# Patient Record
Sex: MALE | Race: WHITE | Employment: OTHER | URBAN - METROPOLITAN AREA
[De-identification: names, ages, dates, MRNs, and addresses within clinical notes are randomized per-mention and may not be internally consistent; named-entity substitution may affect disease eponyms.]

---

## 2018-01-02 ENCOUNTER — HOSPITAL ENCOUNTER (OUTPATIENT)
Dept: PHYSICAL THERAPY | Age: 63
Discharge: HOME | End: 2018-01-02
Payer: COMMERCIAL

## 2018-01-02 PROCEDURE — 97110 THERAPEUTIC EXERCISES: CPT

## 2018-01-02 PROCEDURE — 97162 PT EVAL MOD COMPLEX 30 MIN: CPT

## 2018-01-02 NOTE — PROGRESS NOTES
[facility] at [address] [place] [address]  Phone: [phone]   Fax: [phone]    Plan of Care/ Statement of Necessity for Physical Therapy Services     Patient name: Bradley Godoy Start of Care: 2018   Referral source: Sanjay Cole [place] : 1955    Medical Diagnosis: Low back pain [M54.5]   Onset Date: 2017   Treatment Diagnosis: Mechanical low back pain   Prior Hospitalization: see medical history Provider#: [id number]   Medications: Verified on Patient summary List    Comorbidities: right knee replacement , right eye muscle repair/surgery, arthritis   Prior Level of Function: was independent with ADLs and activities now has to modify secondary to pain     The Plan of Care and following information is based on the information from the initial evaluation. Assessment/ key information:   Patient was a 57year old male who presents with mechanical low back pain. On  when he fell off a ladder about 10 feet. He went to ER two days later and did x-ray which indicated no acute fracture. He was still having pain mostly with walking but has progressed a great deal. Patient has addressed with naproxin and has returned to physical activity around the house. He denies any radiating pain. Examination performed revealing full trunk AROM however pain at end range with flexion>extension, TTP along lumbar and gluteal region/sacral region, and decreased strength in hip flexion. Patient discussed with patient how he pain has greatly improved and feel he could perform HEP on his own. PT discussed options with patient and decided on having patient stay on caseload with possibility of continuing with therapy services should pain exacerbate or no change in the next two weeks. If so, patient would return and resume therapy services to address deficits with low back pain.    Evaluation Complexity History MEDIUM  Complexity : 1-2 comorbidities / personal factors will impact the outcome/ POC ; Examination MEDIUM Complexity : 3 Standardized tests and measures addressing body structure, function, activity limitation and / or participation in recreation  ;Presentation MEDIUM Complexity : Evolving with changing characteristics  ; Clinical Decision Making MEDIUM Complexity : FOTO score of 26-74  Overall Complexity Rating: MEDIUM  Problem List: pain affecting function, decrease ROM, decrease strength, edema affecting function, impaired gait/ balance, decrease ADL/ functional abilitiies, decrease activity tolerance, decrease flexibility/ joint mobility and decrease transfer abilities   Treatment Plan may include any combination of the following: Therapeutic exercise, Therapeutic activities, Neuromuscular re-education, Physical agent/modality, Gait/balance training, Manual therapy, Patient education, Functional mobility training and Stair training  Patient / Family readiness to learn indicated by: asking questions, trying to perform skills and interest  Persons(s) to be included in education: patient (P)  Barriers to Learning/Limitations: None  Patient Goal (s): decrease/eliminate pain clearing to return to work-  Patient Self Reported Health Status: good  Rehabilitation Potential: good    Short Term Goals: To be accomplished in 2 weeks:  1. Patient will be compliant with HEP as PT progresses. Status @ Eval: initiated HEP  2. Patient will have decreased pain to 1/10 at worst with activities to be able to tolerate prolonged ambulation  Status @ Eval: 2/10 today  Long Term Goals: To be accomplished in 4 weeks:  1. Patient will have be compliant and independent with HEP to continue with maintenance program at time of discharge. Status @ Eval: initiated   2. Patient will have decreased pain to 0/10 at most with activities to return to work duties. Status @ Eval: 2/10  3.  Patient will have increased hip flexion strength to 5/5 at least to be able to tolerate prolonged ambulation when performing household chores. Status @ Eval: 4/5 left, 4+/5 right  4. Patient will have improved FOTO score by 14 points indicating improved functional activity. Status @ Eval: 60  Frequency / Duration: Patient to be seen 2 times per week for 4 weeks. Patient/ Caregiver education and instruction: Diagnosis, prognosis, other POC and exercises   [x]  Plan of care has been reviewed with JESE Echeverria 1/2/2018 10:40 AM  _____________________________________________________________________  I certify that the above Therapy Services are being furnished while the patient is under my care. I agree with the treatment plan and certify that this therapy is necessary.     Physician's Signature:____________________  Date:__________Time:______    Please sign and return to [facility] at [address] [place] [address]  Phone: [phone]   Fax: [phone]

## 2018-01-02 NOTE — PROGRESS NOTES
PT DAILY TREATMENT NOTE     Patient Name: Carli Zhang  Date:2018  : 1955  [x]  Patient  Verified  Payor: DA VENTURA / Plan: [address] [place] [address] / Product Type: PPO /    In time:10:40  Out time:11:25  Total Treatment Time (min): 45  Visit #: 1 of 8    Treatment Area: Low back pain [M54.5]    SUBJECTIVE  Pain Level (0-10 scale): 2/10  Any medication changes, allergies to medications, adverse drug reactions, diagnosis change, or new procedure performed?: [x] No    [] Yes (see summary sheet for update)  Subjective functional status/changes:   [] No changes reported  SEE POC    OBJECTIVE  Physical Therapy Evaluation - Lumbar Spine ([facility])    OBJECTIVE  Posture:  Lateral Shift: [] R    [] L     [] +  [] -  Kyphosis: [] Increased [] Decreased   []  WNL  Lordosis:  [] Increased [] Decreased   [] WNL  Pelvic symmetry: [] WNL    [] Other:    Gait:  [x] Normal     [] Abnormal:    Active Movements: [] N/A   [] Too acute   [] Other:  ROM % AROM % PROM Comments:pain, area   Forward flexion 40-60 100  Pain with flexion minimal centralized   Extension 20-30 100  Pain with extension minimal centralized   SB right 20-30 100     SB left 20-30 100     Rotation right 5-10 100     Rotation left 5-10 100       Repeated Movements   Effects on present pain: produces (RI), abolishes (A), increases (incr), decreases (decr), centralizes (C), peripheral (PH), no effect (NE)   Pre-Test Sx Flexion Repeated Flexion Extension Repeated Extension Repeated SBL Repeated SBR   Sitting          Standing     NE     Lying      N/A N/A   Comments:  Side Glide:  Sustained passive positioning test:    Neuro Screen [] WNL  Myotome/Dermatome/Reflexes:  Comments:    Dural Mobility:  SLR Sitting: [] R    [] L    [] +    [] -  @ (degrees):           Supine: [x] R    [x] L    [] +    [x] -  @ (degrees):   Slump Test: [] R    [] L    [] +    [] -  @ (degrees):   Prone Knee Bend: [] R    [] L    [] +    [] -     Palpation   [x] Min  [] Mod  [] Severe    Location:left iliocostalis lumborum and pirifromis left side  [] Min  [] Mod  [] Severe    Location:  [] Min  [] Mod  [] Severe    Location:    Stabilization Tests  Multifidus Test  Level 1: Prone abdominal draw in (Goal 6-10mmHG):  Level 2: Supported leg load supine (needle deflection at 40mmHG): [] Yes  [] No   Level 3: Unsupported leg load supine (needle deflection at 40mmHG): [] Yes  [] No     Strength   L(0-5) R (0-5) N/T   Hip Flexion (L1,2) 4 4+ []   Knee Extension (L3,4) 5 5 []   Ankle Dorsiflexion (L4)   []   Great Toe Extension (L5)   []   Ankle Plantarflexion (S1)   []   Knee Flexion (S1,2) 5 5 []   Upper Abdominals   []   Lower Abdominals   []   Paraspinals   []   Back Rotators   []   Gluteus Juve   []   Other   []     Special Tests  Lumbar:  Lumb.  Compression: [] Pos  [] Neg               Lumbar Distraction:   [] Pos  [] Neg    Quadrant:  [] Pos  [] Neg   [] Flex  [] Ext    Sacroilliac:  Gaenslen's: [] R    [] L    [] +    [] -     Compression: [x] +    [] -     Gapping:  [] +    [] -     Thigh Thrust: [] R    [] L    [] +    [] -     Leg Length: [] +    [] -   Position:    Crests:    ASIS:    PSIS:    Sacral Sulcus:    Mobility: Standing flex:     Sitting flex:     Supine to sit:     Prone knee bend:         Hip: Mitra Maid:  [] R    [] L    [] +    [] -     Scour:  [] R    [] L    [] +    [] -     Piriformis: [] R    [] L    [] +    [] -          Deficits: Peng's: [] R    [] L    [] +    [] -     Trvais: [] R    [] L    [] +    [] -     Hamstrings 90/90:    Gastrocsoleus (to neutral): Right: Left:       Global Muscular Weakness:  Abdominals:  Quadratus Lumborum:  Paraspinals:3/5   Other:    Other tests/comments:    30 min [x]Eval                  []Re-Eval       15 min Therapeutic Exercise:  [] See flow sheet :   Rationale: increase ROM, increase strength and improve coordination to improve the patients ability to maneuver lumbar region such as squatting or walking    Other Objective/Functional Measures: FOTO:     Pain Level (0-10 scale) post treatment: 2/10    ASSESSMENT/Changes in Function:  [x]  See Plan of Care  []  See progress note/recertification  []  See Discharge Summary         Progress towards goals / Updated goals:  SEE POC    PLAN  []  Upgrade activities as tolerated     []  Continue plan of care  []  Update interventions per flow sheet       []  Discharge due to:_  []  Other:_  Patient to be put on hold for now if no improvement with patient performing HEP independently within two weeks    Jessie Lopez 1/2/2018  10:40 AM    No future appointments.

## 2018-01-26 ENCOUNTER — HOSPITAL ENCOUNTER (OUTPATIENT)
Dept: PHYSICAL THERAPY | Age: 63
Discharge: HOME | End: 2018-01-26
Payer: COMMERCIAL

## 2018-01-26 PROCEDURE — 97110 THERAPEUTIC EXERCISES: CPT

## 2018-01-26 NOTE — PROGRESS NOTES
[facility] at [address] [place] [address]  Phone: [phone]   Fax: [phone]    Progress Report      Patient name: Carli Zhang     Start of Care: 2018  Referral source: Myrna Ibarra, [id number]    : 1955  Medical/Treatment Diagnosis: Low back pain [M54.5]  Onset Date: 2017  Prior Hospitalization: see medical history   Provider#: [id number]  Comorbidities: right knee replacement , right eye muscle repair/surgery, arthritis  Prior Level of Function: was independent with ADLs and activities now has to modify secondary to pain      Medications: Verified on Patient Summary List    Visits from Start of Care: 2    Missed Visits: 0  Reporting Period : 2018 to 2018    Subjective Reports: Patient reports no change with pain and would like to resume therapy services    Short Term Goals: To be accomplished in 2 weeks:  1. Patient will be compliant with HEP as PT progresses. Status @ Eval: initiated HEP  Status @ PN: patient compliant   2. Patient will have decreased pain to 1/10 at worst with activities to be able to tolerate prolonged ambulation  Status @ Eval: 2/10 today  Status @ PN: 4/10 at night   Long Term Goals: To be accomplished in 4 weeks:  1. Patient will have be compliant and independent with HEP to continue with maintenance program at time of discharge. Status @ Eval: initiated   Status @ PN: compliant  2. Patient will have decreased pain to 0/10 at most with activities to return to work duties. Status @ Eval: 2/10  Status @ PN: 4/10 at night  3. Patient will have increased hip flexion strength to 5/5 at least to be able to tolerate prolonged ambulation when performing household chores. Status @ Eval: 4/5 left, 4+/5 right  Status @ PN: 4+/5 right, 3+/5 left   4. Patient will have improved FOTO score by 14 points indicating improved functional activity.   Status @ Eval: 61  Status @ PN: to be retested 5th visit    Key functional changes: no change    Problems/ barriers to goal attainment: none     Assessment / Recommendations:  Patient tolerated new exercises fair with no reports of increased pain.  Patient will continue to benefit from skilled PT services to modify and progress therapeutic interventions, address functional mobility deficits, address ROM deficits, address strength deficits, analyze and address soft tissue restrictions and analyze and cue movement patterns to attain remaining goals.      Problem List: pain affecting function, decrease ROM, decrease strength, edema affecting function, impaired gait/ balance, decrease ADL/ functional abilitiies, decrease activity tolerance, decrease flexibility/ joint mobility and decrease transfer abilities   Treatment Plan: Therapeutic exercise, Therapeutic activities, Neuromuscular re-education, Physical agent/modality, Gait/balance training, Manual therapy, Patient education, Functional mobility training, Home safety training and Stair training    Patient Goal (s) has been updated and includes: decrease/eliminate pain clearing to return to work-      Updated Goals to be accomplished in 4 weeks:       Continue with unmet goals     Frequency / Duration: Patient to be seen 2 times per week for 4 weeks:      Javi Veras 1/26/2018 4:00 PM

## 2018-01-26 NOTE — PROGRESS NOTES
PT DAILY TREATMENT NOTE     Patient Name: Deepika Wallace  Date:2018  : 1955  [x]  Patient  Verified  Payor: BLUE CROSS / Plan: [address] [place] [address] / Product Type: PPO /    In time:03:30  Out time: 04:15  Total Treatment Time (min): 40  Visit #: 2 of 8    Treatment Area: Low back pain [M54.5]    SUBJECTIVE  Pain Level (0-10 scale): 1-2/10  Any medication changes, allergies to medications, adverse drug reactions, diagnosis change, or new procedure performed?: [x] No    [] Yes (see summary sheet for update)  Subjective functional status/changes:   [] No changes reported  Patient reports tried doing HEP on his own however pain has not improved so wants to continue with therapy services. OBJECTIVE    40 min Therapeutic Exercise:  [] See flow sheet :   Rationale: increase ROM, increase strength, improve coordination and improve balance to improve the patients ability to tolerate prolonged trunk flexion     Other Objective/Functional Measures:   Refer to goals      Pain Level (0-10 scale) post treatment:  0/10    ASSESSMENT/Changes in Function:   Patient tolerated new exercises fair with no reports of increased pain. Patient will continue to benefit from skilled PT services to modify and progress therapeutic interventions, address functional mobility deficits, address ROM deficits, address strength deficits, analyze and address soft tissue restrictions and analyze and cue movement patterns to attain remaining goals. []  See Plan of Care  []  See progress note/recertification  []  See Discharge Summary         Progress towards goals / Updated goals:  Short Term Goals: To be accomplished in 2 weeks:  1. Patient will be compliant with HEP as PT progresses. Status @ Eval: initiated HEP  Status @ PN: patient compliant   2.  Patient will have decreased pain to 1/10 at worst with activities to be able to tolerate prolonged ambulation  Status @ Eval: 2/10 today  Status @ PN: 4/10 at night   Long Term Goals: To be accomplished in 4 weeks:  1. Patient will have be compliant and independent with HEP to continue with maintenance program at time of discharge. Status @ Eval: initiated   Status @ PN: compliant  2. Patient will have decreased pain to 0/10 at most with activities to return to work duties. Status @ Eval: 2/10  Status @ PN: 4/10 at night  3. Patient will have increased hip flexion strength to 5/5 at least to be able to tolerate prolonged ambulation when performing household chores. Status @ Eval: 4/5 left, 4+/5 right  Status @ PN: 4+/5 right, 3+/5 left   4. Patient will have improved FOTO score by 14 points indicating improved functional activity. Status @ Eval: 60  Status @ PN: to be retested 5th visit    Frequency / Duration: Patient to be seen 2 times per week for 4 weeks. PLAN  []  Upgrade activities as tolerated     [x]  Continue plan of care  []  Update interventions per flow sheet       []  Discharge due to:_  []  Other:_      Ranjeet Araujo 1/26/2018  3:36 PM    No future appointments.

## 2018-01-29 ENCOUNTER — HOSPITAL ENCOUNTER (OUTPATIENT)
Dept: PHYSICAL THERAPY | Age: 63
Discharge: HOME | End: 2018-01-29
Payer: COMMERCIAL

## 2018-01-29 PROCEDURE — 97110 THERAPEUTIC EXERCISES: CPT

## 2018-01-29 NOTE — PROGRESS NOTES
PT DAILY TREATMENT NOTE     Patient Name: Kaiden Castro  Date:2018  : 1955  [x]  Patient  Verified  Payor: BLUE CROSS / Plan: [address] [place] [address] / Product Type: PPO /    In time:08:30  Out time:09:15  Total Treatment Time (min): 45  Visit #: 3 of 9    Treatment Area: There are no admission diagnoses documented for this encounter. SUBJECTIVE  Pain Level (0-10 scale): 0/10 but mostly pain at night  Any medication changes, allergies to medications, adverse drug reactions, diagnosis change, or new procedure performed?: [x] No    [] Yes (see summary sheet for update)  Subjective functional status/changes:   [x] No changes reported      OBJECTIVE    45 min Therapeutic Exercise:  [] See flow sheet :   Rationale: increase ROM, increase strength, improve coordination and improve balance to improve the patients ability to maneuver back         With   [] TE   [] TA   [] neuro   [] other: Patient Education: [x] Review HEP    [] Progressed/Changed HEP based on:   [] positioning   [] body mechanics   [] transfers   [] heat/ice application    [] other:      Other Objective/Functional Measures:      Pain Level (0-10 scale) post treatment: 0/10    ASSESSMENT/Changes in Function:   Patient tolerated Oov activities well requiring vc's on proper form and to increase points of contact to increase balance as needed. No reports of increased pain. Patient will continue to benefit from skilled PT services to modify and progress therapeutic interventions, address functional mobility deficits, address ROM deficits, address strength deficits, analyze and address soft tissue restrictions and analyze and cue movement patterns to attain remaining goals. []  See Plan of Care  []  See progress note/recertification  []  See Discharge Summary         Progress towards goals / Updated goals:  Short Term Goals: To be accomplished in 2 weeks:  1. Patient will be compliant with HEP as PT progresses.   Status @ Eval: initiated HEP  Status @ PN: patient compliant   Current: continued compliance  2. Patient will have decreased pain to 1/10 at worst with activities to be able to tolerate prolonged ambulation  Status @ Eval: 2/10 today  Status @ PN: 4/10 at night   Long Term Goals: To be accomplished in 4 weeks:  1. Patient will have be compliant and independent with HEP to continue with maintenance program at time of discharge. Status @ Eval: initiated   Status @ PN: compliant  Current: continued compliance  2. Patient will have decreased pain to 0/10 at most with activities to return to work duties. Status @ Eval: 2/10  Status @ PN: 4/10 at night  3. Patient will have increased hip flexion strength to 5/5 at least to be able to tolerate prolonged ambulation when performing household chores. Status @ Eval: 4/5 left, 4+/5 right  Status @ PN: 4+/5 right, 3+/5 left   4. Patient will have improved FOTO score by 14 points indicating improved functional activity.   Status @ Eval: 60  Status @ PN: to be retested 5th visit       PLAN  []  Upgrade activities as tolerated     [x]  Continue plan of care  []  Update interventions per flow sheet       []  Discharge due to:_  []  Other:_      Atul Pump 1/29/2018  8:36 AM    Future Appointments  Date Time Provider Zenon Dixon   1/29/2018 9:00 AM [place] [facility]   1/30/2018 9:30 AM Walt Terry PTA [facility]

## 2018-01-30 ENCOUNTER — HOSPITAL ENCOUNTER (OUTPATIENT)
Dept: PHYSICAL THERAPY | Age: 63
Discharge: HOME | End: 2018-01-30
Payer: COMMERCIAL

## 2018-01-30 PROCEDURE — 97112 NEUROMUSCULAR REEDUCATION: CPT

## 2018-01-30 PROCEDURE — 97140 MANUAL THERAPY 1/> REGIONS: CPT

## 2018-01-30 NOTE — PROGRESS NOTES
PT DAILY TREATMENT NOTE     Patient Name: Adina Esparza  Date:2018  : 1955  [x]  Patient  Verified  Payor: BLUE CROSS / Plan: [address] [place] [address] / Product Type: PPO /    In time:915  Out time:1000  Total Treatment Time (min): 45  Visit #: 4 of 9    Treatment Area: Low back pain [M54.5]    SUBJECTIVE  Pain Level (0-10 scale):1/10  Any medication changes, allergies to medications, adverse drug reactions, diagnosis change, or new procedure performed?: [x] No    [] Yes (see summary sheet for update)  Subjective functional status/changes:   [] No changes reported  Pt reports that he has pain at night that is terrible but during the day he is fine. He reports that after he urinates in the morning the pain goes away. OBJECTIVE       20 min Neuromuscular Re-education:  [x]  See flow sheet :facilitate improved trunk mobility   Rationale: increase ROM, increase strength, improve coordination and increase proprioception  to improve the patients ability to sleep at night    25 min Manual Therapy:  Myofascial leg pull on left, MFR to left QL, multifidus and obliques   Rationale: decrease pain, increase ROM, increase tissue extensibility, decrease edema  and decrease trigger points to sleep at night            With   [] TE   [] TA   [x] neuro   [] other: Patient Education: [x] Review HEP    [] Progressed/Changed HEP based on:   [] positioning   [] body mechanics   [] transfers   [x] heat/ice application    [] other:      Other Objective/Functional Measures:     Pain Level (0-10 scale) post treatment: 0/10    ASSESSMENT/Changes in Function: Pt demonstrates left posterior rotated innominate corrected with myofascial work. Pt very limited with thoracic rotation contributing to ongoing pain in QL on left side.       Patient will continue to benefit from skilled PT services to address functional mobility deficits, address ROM deficits, address strength deficits, analyze and address soft tissue restrictions, analyze and cue movement patterns, analyze and modify body mechanics/ergonomics and assess and modify postural abnormalities to attain remaining goals. []  See Plan of Care  []  See progress note/recertification  []  See Discharge Summary         Progress towards goals / Updated goals:  Short Term Goals: To be accomplished in 2 weeks:  1. Patient will be compliant with HEP as PT progresses. Status @ Eval: initiated HEP  Status @ PN: patient compliant   Current: continued compliance  2. Patient will have decreased pain to 1/10 at worst with activities to be able to tolerate prolonged ambulation  Status @ Eval: 2/10 today  Status @ PN: 4/10 at night   Current: Continued pain at night  Long Term Goals: To be accomplished in 4 weeks:  1. Patient will have be compliant and independent with HEP to continue with maintenance program at time of discharge. Status @ Eval: initiated   Status @ PN: compliant  Current: continued compliance  2. Patient will have decreased pain to 0/10 at most with activities to return to work duties. Status @ Eval: 2/10  Status @ PN: 4/10 at night  3. Patient will have increased hip flexion strength to 5/5 at least to be able to tolerate prolonged ambulation when performing household chores. Status @ Eval: 4/5 left, 4+/5 right  Status @ PN: 4+/5 right, 3+/5 left   4. Patient will have improved FOTO score by 14 points indicating improved functional activity.   Status @ Eval: 60  Status @ PN: to be retested 5th visit  Current: NEARLY MET 72    PLAN  [x]  Upgrade activities as tolerated     [x]  Continue plan of care  []  Update interventions per flow sheet       []  Discharge due to:_  []  Other:_      Gasper Brower PTA 1/30/2018  6:55 AM    Future Appointments  Date Time Provider Zenon Dixon   1/30/2018 9:30 AM Gasper Brower PTA [facility]

## 2018-02-05 ENCOUNTER — HOSPITAL ENCOUNTER (OUTPATIENT)
Dept: PHYSICAL THERAPY | Age: 63
Discharge: HOME | End: 2018-02-05
Payer: COMMERCIAL

## 2018-02-05 PROCEDURE — 97110 THERAPEUTIC EXERCISES: CPT

## 2018-02-05 PROCEDURE — 97016 VASOPNEUMATIC DEVICE THERAPY: CPT

## 2018-02-05 NOTE — PROGRESS NOTES
PT DAILY TREATMENT NOTE     Patient Name: Lorraine Bowie  Date:2018  : 1955  [x]  Patient  Verified  Payor: DA [place] / Plan: [address] [place] [address] / Product Type: PPO /    In time:08:30  Out time:09: 30  Total Treatment Time (min): 60  Visit #:5 of 9    Treatment Area: Low back pain [M54.5]    SUBJECTIVE  Pain Level (0-10 scale): 0/10  Any medication changes, allergies to medications, adverse drug reactions, diagnosis change, or new procedure performed?: [x] No    [] Yes (see summary sheet for update)  Subjective functional status/changes:   [x] No changes reported      OBJECTIVE    Modality rationale: decrease pain and increase tissue extensibility to improve the patients ability to maneuver back    Min Type Additional Details    [] Estim:  []Unatt       []IFC  []Premod                        []Other:  []w/ice   []w/heat  Position:  Location:    [] Estim: []Att    []TENS instruct  []NMES                    []Other:  []w/US   []w/ice   []w/heat  Position:  Location:    []  Traction: [] Cervical       []Lumbar                       [] Prone          []Supine                       []Intermittent   []Continuous Lbs:  [] before manual  [] after manual    []  Ultrasound: []Continuous   [] Pulsed                           []1MHz   []3MHz W/cm2:  Location:    []  Iontophoresis with dexamethasone         Location: [] Take home patch   [] In clinic   10 []  Ice     [x]  heat  []  Ice massage  []  Laser   []  Anodyne Position prone  Location: lumbar/hip left side    []  Laser with stim  []  Other:  Position:  Location:    []  Vasopneumatic Device Pressure:       [] lo [] med [] hi   Temperature: [] lo [] med [] hi   [] Skin assessment post-treatment:  []intact []redness- no adverse reaction    []redness  adverse reaction:     50 min Therapeutic Exercise:  [] See flow sheet :   Rationale: increase ROM, increase strength, improve coordination and improve balance to improve the patients ability to increase lumbar/core stabilization    Other Objective/Functional Measures:      Pain Level (0-10 scale) post treatment: 0/10    ASSESSMENT/Changes in Function: Patient tolerated new exercises fair with no reports of increased pain. Patient will continue to benefit from skilled PT services to modify and progress therapeutic interventions, address functional mobility deficits, address ROM deficits, address strength deficits and analyze and address soft tissue restrictions to attain remaining goals. []  See Plan of Care  []  See progress note/recertification  []  See Discharge Summary         Progress towards goals / Updated goals:  Short Term Goals: To be accomplished in 2 weeks:  1. Patient will be compliant with HEP as PT progresses. Status @ Eval: initiated HEP  Status @ PN: patient compliant   Current: continued compliance  2. Patient will have decreased pain to 1/10 at worst with activities to be able to tolerate prolonged ambulation  Status @ Eval: 2/10 today  Status @ PN: 4/10 at night   Current: Continued pain at night 4-5/10  Long Term Goals: To be accomplished in 4 weeks:  1. Patient will have be compliant and independent with HEP to continue with maintenance program at time of discharge. Status @ Eval: initiated   Status @ PN: compliant  Current: continued compliance  2. Patient will have decreased pain to 0/10 at most with activities to return to work duties. Status @ Eval: 2/10  Status @ PN: 4/10 at night  Current: pain at night 4-5/10  3. Patient will have increased hip flexion strength to 5/5 at least to be able to tolerate prolonged ambulation when performing household chores. Status @ Eval: 4/5 left, 4+/5 right  Status @ PN: 4+/5 right, 3+/5 left   4. Patient will have improved FOTO score by 14 points indicating improved functional activity.   Status @ Eval: 61  Status @ PN: to be retested 5th visit  Current: NEARLY MET 72    PLAN  []  Upgrade activities as tolerated     [x]  Continue plan of care  []  Update interventions per flow sheet       []  Discharge due to:_  []  Other:_  Add more trunk extension exercises next visit    Watt Kehr 2/5/2018  8:35 AM    Future Appointments  Date Time Provider Zenon Dixon   2/7/2018 9:00 AM [place] [facility]

## 2018-02-07 ENCOUNTER — HOSPITAL ENCOUNTER (OUTPATIENT)
Dept: PHYSICAL THERAPY | Age: 63
Discharge: HOME | End: 2018-02-07
Payer: COMMERCIAL

## 2018-02-07 PROCEDURE — 97110 THERAPEUTIC EXERCISES: CPT

## 2018-02-07 NOTE — PROGRESS NOTES
PT DAILY TREATMENT NOTE     Patient Name: Lexi Kaur  Date:2018  : 1955  [x]  Patient  Verified  Payor: BLUE CROSS / Plan: [address] [place] [address] / Product Type: PPO /    In time:09:00  Out time:09:35  Total Treatment Time (min): 35  Visit #: 6 of 9    Treatment Area: Low back pain [M54.5]    SUBJECTIVE  Pain Level (0-10 scale): 0/10  Any medication changes, allergies to medications, adverse drug reactions, diagnosis change, or new procedure performed?: [x] No    [] Yes (see summary sheet for update)  Subjective functional status/changes:   [] No changes reported      OBJECTIVE    35 min Therapeutic Exercise:  [] See flow sheet :   Rationale: increase ROM, increase strength and improve coordination to improve the patients ability to maneuver low back     With   [] TE   [] TA   [] neuro   [] other: Patient Education: [x] Review HEP    [] Progressed/Changed HEP based on:   [] positioning   [] body mechanics   [] transfers   [] heat/ice application    [] other:      Other Objective/Functional Measures:   Refer to goals     Pain Level (0-10 scale) post treatment: 0/10    ASSESSMENT/Changes in Function:   Patient tolerated exercises fair focusing on extension. Increased noted discomfort into extension position. PT had patient perform prone lying with two pillows and assigned to HEP to increase tolerance with extension as able. Patient will continue to benefit from skilled PT services to modify and progress therapeutic interventions, address functional mobility deficits, address ROM deficits, address strength deficits, analyze and address soft tissue restrictions and analyze and cue movement patterns to attain remaining goals. []  See Plan of Care  []  See progress note/recertification  []  See Discharge Summary         Progress towards goals / Updated goals:  Short Term Goals: To be accomplished in 2 weeks:  1. Patient will be compliant with HEP as PT progresses.   Status @ Eval: initiated HEP  Status @ PN: patient compliant   Current: continued compliance with addition of prone lying with two pillows   2. Patient will have decreased pain to 1/10 at worst with activities to be able to tolerate prolonged ambulation  Status @ Eval: 2/10 today  Status @ PN: 4/10 at night   Current: Continued pain at night 4-5/10  Long Term Goals: To be accomplished in 4 weeks:  1. Patient will have be compliant and independent with HEP to continue with maintenance program at time of discharge. Status @ Eval: initiated   Status @ PN: compliant  Current: continued compliance and addition of prone lying with two pillows   2. Patient will have decreased pain to 0/10 at most with activities to return to work duties. Status @ Eval: 2/10  Status @ PN: 4/10 at night  Current: pain at night 4-5/10  3. Patient will have increased hip flexion strength to 5/5 at least to be able to tolerate prolonged ambulation when performing household chores. Status @ Eval: 4/5 left, 4+/5 right  Status @ PN: 4+/5 right, 3+/5 left   4. Patient will have improved FOTO score by 14 points indicating improved functional activity.   Status @ Eval: 60  Status @ PN: to be retested 5th visit  Current: NEARLY MET 72    PLAN  []  Upgrade activities as tolerated     [x]  Continue plan of care  []  Update interventions per flow sheet       []  Discharge due to:_  []  Other:_      Jose Amador 2/7/2018  9:08 AM    Future Appointments  Date Time Provider Zenon Dixon   2/14/2018 9:30 AM Armando Res, PT [facility]   2/15/2018 5:30 PM Robert Tineo PT [facility]   2/21/2018 9:30 AM Armando Res, PT [facility]   2/23/2018 8:00 AM [place] [facility]   2/27/2018 8:00 AM Armando Res, PT [facility]

## 2018-02-14 ENCOUNTER — HOSPITAL ENCOUNTER (OUTPATIENT)
Dept: PHYSICAL THERAPY | Age: 63
Discharge: HOME | End: 2018-02-14
Payer: COMMERCIAL

## 2018-02-14 PROCEDURE — 97112 NEUROMUSCULAR REEDUCATION: CPT

## 2018-02-14 PROCEDURE — 97110 THERAPEUTIC EXERCISES: CPT

## 2018-02-14 NOTE — PROGRESS NOTES
PT DAILY TREATMENT NOTE     Patient Name: William Marr  Date:2018  : 1955  [x]  Patient  Verified  Payor: BLUE CROSS / Plan: [address] [place] [address] / Product Type: PPO /    In time:932  Out time:1010  Total Treatment Time (min): 38  Visit #: 7 of 9    Treatment Area: Low back pain [M54.5]    SUBJECTIVE  Pain Level (0-10 scale): 0/10  Any medication changes, allergies to medications, adverse drug reactions, diagnosis change, or new procedure performed?: [x] No    [] Yes (see summary sheet for update)  Subjective functional status/changes:   [] No changes reported  I get pain at night. It is improving overall.     OBJECTIVE    Modality rationale:    Min Type Additional Details    [] Estim:  []Unatt       []IFC  []Premod                        []Other:  []w/ice   []w/heat  Position:  Location:    [] Estim: []Att    []TENS instruct  []NMES                    []Other:  []w/US   []w/ice   []w/heat  Position:  Location:    []  Traction: [] Cervical       []Lumbar                       [] Prone          []Supine                       []Intermittent   []Continuous Lbs:  [] before manual  [] after manual    []  Ultrasound: []Continuous   [] Pulsed                           []1MHz   []3MHz W/cm2:  Location:    []  Iontophoresis with dexamethasone         Location: [] Take home patch   [] In clinic    []  Ice     []  heat  []  Ice massage  []  Laser   []  Anodyne Position:  Location:    []  Laser with stim  []  Other:  Position:  Location:    []  Vasopneumatic Device Pressure:       [] lo [] med [] hi   Temperature: [] lo [] med [] hi   [] Skin assessment post-treatment:  []intact []redness- no adverse reaction    []redness  adverse reaction:      min []Eval                  []Re-Eval       28 min Therapeutic Exercise:  [x] See flow sheet :   Rationale: increase ROM and increase strength to improve the patients ability to sleep at night     min Therapeutic Activity:  []  See flow sheet :        10 min Neuromuscular Re-education:  []  See flow sheet :   Rationale: increase strength and increase proprioception  to improve the patients ability to sleep at night     min Manual Therapy:         min Gait Training:  ___ feet with ___ device on level surfaces with ___ level of assist   Rationale: With   [] TE   [] TA   [] neuro   [] other: Patient Education: [x] Review HEP    [] Progressed/Changed HEP based on:   [] positioning   [] body mechanics   [] transfers   [] heat/ice application    [] other:      Other Objective/Functional Measures: none taken today     Pain Level (0-10 scale) post treatment: 0/10    ASSESSMENT/Changes in Function: No new progress. Patient will continue to benefit from skilled PT services to modify and progress therapeutic interventions, address ROM deficits, address strength deficits, analyze and address soft tissue restrictions, analyze and cue movement patterns, analyze and modify body mechanics/ergonomics and assess and modify postural abnormalities to attain remaining goals. []  See Plan of Care  []  See progress note/recertification  []  See Discharge Summary         Progress towards goals / Updated goals:  Short Term Goals: To be accomplished in 2 weeks:  1. Patient will be compliant with HEP as PT progresses. Status @ Eval: initiated HEP  Status @ PN: patient compliant   Current: continued compliance with addition of prone lying with two pillows   2. Patient will have decreased pain to 1/10 at worst with activities to be able to tolerate prolonged ambulation  Status @ Eval: 2/10 today  Status @ PN: 4/10 at night   Current: Continued pain at night 4-5/10  Long Term Goals: To be accomplished in 4 weeks:  1. Patient will have be compliant and independent with HEP to continue with maintenance program at time of discharge. Status @ Eval: initiated   Status @ PN: compliant  Current: continued compliance and addition of prone lying with two pillows   2.  Patient will have decreased pain to 0/10 at most with activities to return to work duties. Status @ Eval: 2/10  Status @ PN: 4/10 at night  Current: pain at night 4-5/10  3. Patient will have increased hip flexion strength to 5/5 at least to be able to tolerate prolonged ambulation when performing household chores. Status @ Eval: 4/5 left, 4+/5 right  Status @ PN: 4+/5 right, 3+/5 left   4. Patient will have improved FOTO score by 14 points indicating improved functional activity.   Status @ Eval: 60  Status @ PN: to be retested 5th visit  Current: NEARLY MET 72     PLAN    PLAN  [x]  Upgrade activities as tolerated     [x]  Continue plan of care  []  Update interventions per flow sheet       []  Discharge due to:_  []  Other:_      Lisset Augustine PT 2/14/2018  9:36 AM    Future Appointments  Date Time Provider Zenon Dixon   2/15/2018 5:30 PM JACY Cope [facility]   2/21/2018 9:30 AM JACY Cope [facility]   2/23/2018 8:00 AM [place] [facility]   2/27/2018 8:00 AM JACY Cope [facility]

## 2018-02-15 ENCOUNTER — HOSPITAL ENCOUNTER (OUTPATIENT)
Dept: PHYSICAL THERAPY | Age: 63
Discharge: HOME | End: 2018-02-15
Payer: COMMERCIAL

## 2018-02-15 PROCEDURE — 97110 THERAPEUTIC EXERCISES: CPT

## 2018-02-15 PROCEDURE — 97112 NEUROMUSCULAR REEDUCATION: CPT

## 2018-02-15 NOTE — PROGRESS NOTES
PT DAILY TREATMENT NOTE     Patient Name: Eartha Gosselin  Date:2/15/2018  : 1955  [x]  Patient  Verified  Payor: DA [place] / Plan: [address] [place] [address] / Product Type: PPO /    In time:520  Out time:605  Total Treatment Time (min): 45  Visit #: 8 of 10    Treatment Area: Low back pain [M54.5]    SUBJECTIVE  Pain Level (0-10 scale): 1-2/10  Any medication changes, allergies to medications, adverse drug reactions, diagnosis change, or new procedure performed?: [x] No    [] Yes (see summary sheet for update)  Subjective functional status/changes:   [] No changes reported  Been painting this afternoon, so hurting a little.     OBJECTIVE    Modality rationale: decrease pain and increase tissue extensibility to improve the patients ability to increase activity/position tolerance   Min Type Additional Details    [] Estim:  []Unatt       []IFC  []Premod                        []Other:  []w/ice   []w/heat  Position:  Location:    [] Estim: []Att    []TENS instruct  []NMES                    []Other:  []w/US   []w/ice   []w/heat  Position:  Location:    []  Traction: [] Cervical       []Lumbar                       [] Prone          []Supine                       []Intermittent   []Continuous Lbs:  [] before manual  [] after manual    []  Ultrasound: []Continuous   [] Pulsed                           []1MHz   []3MHz W/cm2:  Location:    []  Iontophoresis with dexamethasone         Location: [] Take home patch   [] In clinic   10 []  Ice     [x]  heat  []  Ice massage  []  Laser   []  Anodyne Position: H/L supine  Location:back    []  Laser with stim  []  Other:  Position:  Location:    []  Vasopneumatic Device Pressure:       [] lo [] med [] hi   Temperature: [] lo [] med [] hi   [] Skin assessment post-treatment:  []intact []redness- no adverse reaction    []redness  adverse reaction:      min []Eval                  []Re-Eval       23 min Therapeutic Exercise:  [x] See flow sheet :   Rationale: increase ROM and increase strength to improve the patients ability to sleep at night     min Therapeutic Activity:  []  See flow sheet :        12 min Neuromuscular Re-education:  [x]  See flow sheet :   Rationale: increase strength and increase proprioception  to improve the patients ability to sleep at night     min Manual Therapy:          min Gait Training:  ___ feet with ___ device on level surfaces with ___ level of assist   Rationale: With   [] TE   [] TA   [] neuro   [] other: Patient Education: [x] Review HEP    [] Progressed/Changed HEP based on:   [] positioning   [] body mechanics   [] transfers   [] heat/ice application    [] other:      Other Objective/Functional Measures: see goal review for hip flex strength     Pain Level (0-10 scale) post treatment: 0/10    ASSESSMENT/Changes in Function: Hip flex strength improved to WNL. Patient will continue to benefit from skilled PT services to modify and progress therapeutic interventions, address ROM deficits, address strength deficits, analyze and address soft tissue restrictions, analyze and cue movement patterns, analyze and modify body mechanics/ergonomics and assess and modify postural abnormalities to attain remaining goals. []  See Plan of Care  []  See progress note/recertification  []  See Discharge Summary         Progress towards goals / Updated goals:  Short Term Goals: To be accomplished in 2 weeks:  1. Patient will be compliant with HEP as PT progresses. Status @ Eval: initiated HEP  Status @ PN: patient compliant   Current: continued compliance with addition of prone lying with two pillows   2. Patient will have decreased pain to 1/10 at worst with activities to be able to tolerate prolonged ambulation  Status @ Eval: 2/10 today  Status @ PN: 4/10 at night   Current: Continued pain at night 4-5/10  Long Term Goals: To be accomplished in 4 weeks:  1.  Patient will have be compliant and independent with HEP to continue with maintenance program at time of discharge. Status @ Eval: initiated   Status @ PN: compliant  Current: continued compliance and addition of prone lying with two pillows   2. Patient will have decreased pain to 0/10 at most with activities to return to work duties. Status @ Eval: 2/10  Status @ PN: 4/10 at night  Current: pain at night 4-5/10  3. Patient will have increased hip flexion strength to 5/5 at least to be able to tolerate prolonged ambulation when performing household chores. Status @ Eval: 4/5 left, 4+/5 right  Status @ PN: 4+/5 right, 3+/5 left   Current Status: MET: B hip flex: 5/5  4. Patient will have improved FOTO score by 14 points indicating improved functional activity.   Status @ Eval: 60  Status @ PN: to be retested 5th visit  Current: NEARLY MET 72        PLAN  [x]  Upgrade activities as tolerated     [x]  Continue plan of care  []  Update interventions per flow sheet       []  Discharge due to:_  []  Other:_      Julián Ashby PT 2/15/2018  5:25 PM    Future Appointments  Date Time Provider Zenon Dixon   2/15/2018 5:30 PM JACY Reeves [facility]   2/21/2018 9:30 AM JACY Reeves [facility]   2/23/2018 8:00 AM [place] [facility]   2/27/2018 8:00 AM JACY Reeves [facility]

## 2018-02-21 ENCOUNTER — HOSPITAL ENCOUNTER (OUTPATIENT)
Dept: PHYSICAL THERAPY | Age: 63
Discharge: HOME | End: 2018-02-21
Payer: COMMERCIAL

## 2018-02-21 PROCEDURE — 97110 THERAPEUTIC EXERCISES: CPT

## 2018-02-21 PROCEDURE — 97112 NEUROMUSCULAR REEDUCATION: CPT

## 2018-02-21 NOTE — PROGRESS NOTES
PT DAILY TREATMENT NOTE     Patient Name: Zorita Nyhan  Date:2018  : 1955  [x]  Patient  Verified  Payor: BLUE CROSS / Plan: [address] [place] [address] / Product Type: PPO /    In time:932  Out time:1024  Total Treatment Time (min): 52  Visit #: 9 of 10    Treatment Area: Low back pain [M54.5]    SUBJECTIVE  Pain Level (0-10 scale): 0/10 Recent max: 2/10   Any medication changes, allergies to medications, adverse drug reactions, diagnosis change, or new procedure performed?: [x] No    [] Yes (see summary sheet for update)  Subjective functional status/changes:   [] No changes reported  I should be ready to wrap up next time.     OBJECTIVE    Modality rationale: decrease pain and increase tissue extensibility to improve the patients ability to increase activity/position tolerance   Min Type Additional Details    [] Estim:  []Unatt       []IFC  []Premod                        []Other:  []w/ice   []w/heat  Position:  Location:    [] Estim: []Att    []TENS instruct  []NMES                    []Other:  []w/US   []w/ice   []w/heat  Position:  Location:    []  Traction: [] Cervical       []Lumbar                       [] Prone          []Supine                       []Intermittent   []Continuous Lbs:  [] before manual  [] after manual    []  Ultrasound: []Continuous   [] Pulsed                           []1MHz   []3MHz W/cm2:  Location:    []  Iontophoresis with dexamethasone         Location: [] Take home patch   [] In clinic   10 []  Ice     [x]  heat  []  Ice massage  []  Laser   []  Anodyne Position: H/L supine  Location: back    []  Laser with stim  []  Other:  Position:  Location:    []  Vasopneumatic Device Pressure:       [] lo [] med [] hi   Temperature: [] lo [] med [] hi   [] Skin assessment post-treatment:  []intact []redness- no adverse reaction    []redness  adverse reaction:      min []Eval                  []Re-Eval       25 min Therapeutic Exercise:  [x] See flow sheet :   Rationale: increase ROM and increase strength to improve the patients ability to sleep at night     min Therapeutic Activity:  []  See flow sheet :        17 min Neuromuscular Re-education:  [x]  See flow sheet :   Rationale: increase strength and increase proprioception  to improve the patients ability to sleep at night     min Manual Therapy:          min Gait Training:  ___ feet with ___ device on level surfaces with ___ level of assist   Rationale: With   [] TE   [] TA   [] neuro   [] other: Patient Education: [x] Review HEP    [] Progressed/Changed HEP based on:   [] positioning   [] body mechanics   [] transfers   [] heat/ice application    [] other:      Other Objective/Functional Measures: none taken today     Pain Level (0-10 scale) post treatment: 0/10    ASSESSMENT/Changes in Function: No new progress. Patient will continue to benefit from skilled PT services to modify and progress therapeutic interventions to attain remaining goals. []  See Plan of Care  []  See progress note/recertification  []  See Discharge Summary         Progress towards goals / Updated goals:  Short Term Goals: To be accomplished in 2 weeks:  1. Patient will be compliant with HEP as PT progresses. Status @ Eval: initiated HEP  Status @ PN: patient compliant   Current: continued compliance with addition of prone lying with two pillows   2. Patient will have decreased pain to 1/10 at worst with activities to be able to tolerate prolonged ambulation  Status @ Eval: 2/10 today  Status @ PN: 4/10 at night   Current: Continued pain at night 4-5/10  Long Term Goals: To be accomplished in 4 weeks:  1. Patient will have be compliant and independent with HEP to continue with maintenance program at time of discharge. Status @ Eval: initiated   Status @ PN: compliant  Current: continued compliance and addition of prone lying with two pillows   2.  Patient will have decreased pain to 0/10 at most with activities to return to work duties. Status @ Eval: 2/10  Status @ PN: 4/10 at night  Current: pain at night 4-5/10  3. Patient will have increased hip flexion strength to 5/5 at least to be able to tolerate prolonged ambulation when performing household chores. Status @ Eval: 4/5 left, 4+/5 right  Status @ PN: 4+/5 right, 3+/5 left   Current Status: MET: B hip flex: 5/5  4. Patient will have improved FOTO score by 14 points indicating improved functional activity.   Status @ Eval: 60  Status @ PN: to be retested 5th visit  Current: NEARLY MET 72        PLAN  [x]  Upgrade activities as tolerated     [x]  Continue plan of care  []  Update interventions per flow sheet       []  Discharge due to:_  [x]  Other: probable D/C next visit to [address] [place] [address], PT 2/21/2018  9:32 AM    Future Appointments  Date Time Provider Zenon Dixon   2/23/2018 8:00 AM [place] [facility]   2/27/2018 8:00 AM Jack Red, PT [facility]

## 2018-02-23 ENCOUNTER — HOSPITAL ENCOUNTER (OUTPATIENT)
Dept: PHYSICAL THERAPY | Age: 63
Discharge: HOME | End: 2018-02-23
Payer: COMMERCIAL

## 2018-02-23 PROCEDURE — 97110 THERAPEUTIC EXERCISES: CPT

## 2018-02-23 NOTE — PROGRESS NOTES
[facility] at [address] [place] [address]  Phone: [phone]   Fax: [phone]    Discharge Summary    Patient name: Dayday Schaffer     Start of Care: 2018  Referral source: Herschel Mcardle, [place]    : 1955  Medical/Treatment Diagnosis: Low back pain [M54.5]  Onset Date:2017  Prior Hospitalization: see medical history   Provider#: [id number]  Prior Hospitalization: see medical history                                                         Provider#: [id number]  Comorbidities: right knee replacement , right eye muscle repair/surgery, arthritis  Prior Level of Function: was independent with ADLs and activities now has to modify secondary to pain   Medications: Verified on Patient Summary List    Visits from Start of Care: 10    Missed Visits: 1  Reporting Period : 2018 to 2018    Short Term Goals: To be accomplished in 2 weeks:  1. Patient will be compliant with HEP as PT progresses. Status @ Eval: initiated HEP  Status @ PN: patient compliant   Discharge: continued compliance with addition of prone lying with one pillow, compliant and independent-MET  2. Patient will have decreased pain to 1/10 at worst with activities to be able to tolerate prolonged ambulation  Status @ Eval: 2/10 today  Status @ PN: 4/10 at night   Discharge: 2-3/10 night, typically 0/10 during day except for certain activities-Progressing NOT MET  Long Term Goals: To be accomplished in 4 weeks:  1. Patient will have be compliant and independent with HEP to continue with maintenance program at time of discharge. Status @ Eval: initiated   Status @ PN: compliant  Discharge:compliant and independent-MET  2. Patient will have decreased pain to 0/10 at most with activities to return to work duties. Status @ Eval: 2/10  Status @ PN: 4/10 at night  Discharge: 2-3/10 night, typically 0/10 during day except for certain activities-Progressing NOT MET  3.  Patient will have increased hip flexion strength to 5/5 at least to be able to tolerate prolonged ambulation when performing household chores. Status @ Eval: 4/5 left, 4+/5 right  Status @ PN: 4+/5 right, 3+/5 left   Discharge : MET: B hip flex: 5/5  4. Patient will have improved FOTO score by 14 points indicating improved functional activity. Status @ Eval: 61  Status @ PN: to be retested 5th visit  Discharge: 80-GOAL MET       Assessment/ Summary of Care:    Patient is ready for discharge at this time. Patient has met strength goals and is compliant/independent with HEP however continues to have pain with activities and mostly at night. However, he can sleep now until 4-5 AM whereas before he had to get up every hour.   No further skilled PT services indicated at this time.        RECOMMENDATIONS:  [x]Discontinue therapy: [x]Patient has reached or is progressing toward set goals      []Patient is non-compliant or has abdicated      []Due to lack of appreciable progress towards set goals    Kadie Perdomo 2/23/2018 8:18 AM

## 2018-02-23 NOTE — PROGRESS NOTES
PT DAILY TREATMENT NOTE     Patient Name: Pham Allred  Date:2018  : 1955  [x]  Patient  Verified  Payor: BLUE CROSS / Plan: [address] [place] [address] / Product Type: PPO /    In time:07:55  Out time:8:51  Total Treatment Time (min): 56  Visit #: 10 of 10    Treatment Area: Low back pain [M54.5]    SUBJECTIVE  Pain Level (0-10 scale): 0/10  Any medication changes, allergies to medications, adverse drug reactions, diagnosis change, or new procedure performed?: [x] No    [] Yes (see summary sheet for update)  Subjective functional status/changes:   [] No changes reported  Patient is ready for discharge. He is to follow up with physician next week about continued pain.      OBJECTIVE    Modality rationale: decrease pain and increase tissue extensibility to improve the patients ability to maneuver back    Min Type Additional Details    [] Estim:  []Unatt       []IFC  []Premod                        []Other:  []w/ice   []w/heat  Position:  Location:    [] Estim: []Att    []TENS instruct  []NMES                    []Other:  []w/US   []w/ice   []w/heat  Position:  Location:    []  Traction: [] Cervical       []Lumbar                       [] Prone          []Supine                       []Intermittent   []Continuous Lbs:  [] before manual  [] after manual    []  Ultrasound: []Continuous   [] Pulsed                           []1MHz   []3MHz W/cm2:  Location:    []  Iontophoresis with dexamethasone         Location: [] Take home patch   [] In clinic   10 []  Ice     [x]  heat  []  Ice massage  []  Laser   []  Anodyne Position: supine  Location: lumbar    []  Laser with stim  []  Other:  Position:  Location:    []  Vasopneumatic Device Pressure:       [] lo [] med [] hi   Temperature: [] lo [] med [] hi   [] Skin assessment post-treatment:  []intact []redness- no adverse reaction    []redness  adverse reaction:     46 min Therapeutic Exercise:  [] See flow sheet :   Rationale: increase ROM, increase strength and improve coordination to improve the patients ability to tolerate extension activities    Other Objective/Functional Measures: FOTO: 80, change of 20       Pain Level (0-10 scale) post treatment: 0/10    ASSESSMENT/Changes in Function:   Patient is ready for discharge at this time. Patient has met strength goals and is compliant/independent with HEP however continues to have pain with activities and mostly at night. However, he can sleep now until 4-5 AM whereas before he had to get up every hour. No further skilled PT services indicated at this time. []  See Plan of Care  []  See progress note/recertification  [x]  See Discharge Summary         Progress towards goals / Updated goals:  Short Term Goals: To be accomplished in 2 weeks:  1. Patient will be compliant with HEP as PT progresses. Status @ Eval: initiated HEP  Status @ PN: patient compliant   Discharge: continued compliance with addition of prone lying with one pillow, compliant and independent-MET  2. Patient will have decreased pain to 1/10 at worst with activities to be able to tolerate prolonged ambulation  Status @ Eval: 2/10 today  Status @ PN: 4/10 at night   Discharge: 2-3/10 night, typically 0/10 during day except for certain activities-Progressing NOT MET  Long Term Goals: To be accomplished in 4 weeks:  1. Patient will have be compliant and independent with HEP to continue with maintenance program at time of discharge. Status @ Eval: initiated   Status @ PN: compliant  Discharge:compliant and independent-MET  2. Patient will have decreased pain to 0/10 at most with activities to return to work duties. Status @ Eval: 2/10  Status @ PN: 4/10 at night  Discharge: 2-3/10 night, typically 0/10 during day except for certain activities-Progressing NOT MET  3. Patient will have increased hip flexion strength to 5/5 at least to be able to tolerate prolonged ambulation when performing household chores.   Status @ Eval: 4/5 left, 4+/5 right  Status @ PN: 4+/5 right, 3+/5 left   Discharge : MET: B hip flex: 5/5  4. Patient will have improved FOTO score by 14 points indicating improved functional activity.   Status @ Eval: 60  Status @ PN: to be retested 5th visit  Discharge: 80-MET      PLAN  []  Upgrade activities as tolerated     []  Continue plan of care  []  Update interventions per flow sheet       [x]  Discharge due to:_goals met  []  Other:_      Lena Brown 2/23/2018  8:09 AM    Future Appointments  Date Time Provider Zenon Dixon   2/27/2018 8:00 AM Scott Briseno PT [facility]

## 2018-02-27 ENCOUNTER — APPOINTMENT (OUTPATIENT)
Dept: PHYSICAL THERAPY | Age: 63
End: 2018-02-27
Payer: COMMERCIAL

## 2019-02-01 ENCOUNTER — HOSPITAL ENCOUNTER (OUTPATIENT)
Dept: PHYSICAL THERAPY | Age: 64
Discharge: HOME | End: 2019-02-01
Payer: MEDICARE

## 2019-02-01 PROCEDURE — 97140 MANUAL THERAPY 1/> REGIONS: CPT | Performed by: PHYSICAL THERAPIST

## 2019-02-01 PROCEDURE — 97110 THERAPEUTIC EXERCISES: CPT | Performed by: PHYSICAL THERAPIST

## 2019-02-01 PROCEDURE — 97161 PT EVAL LOW COMPLEX 20 MIN: CPT | Performed by: PHYSICAL THERAPIST

## 2019-02-01 NOTE — PROGRESS NOTES
PT DAILY TREATMENT NOTE/LUMBAR EVAL 10-18 Patient Name: Dennis Party Date:2019 : 1955 [x]  Patient  Verified Payor: [employer] MEDICARE / Plan: Elpidio Worley reyes / Product Type: Medicare / In time:1015  Out time:1122 Total Treatment Time (min): 67 Visit #: 1 of 12 Medicare/BCBS Only Total Timed Codes (min):  33 1:1 Treatment Time:  79 Treatment Area: Dorsalgia, unspecified [M54.9] SUBJECTIVE Pain Level (0-10 scale): 0 , mid thoracic highest recently 6-7 not sharp but debilitating causing him to want to stop and sit down  
[]constant [x]intermittent []improving []worsening [x]no change since onset Any medication changes, allergies to medications, adverse drug reactions, diagnosis change, or new procedure performed?: [x] No    [] Yes (see summary sheet for update) Subjective functional status/changes:    
PLOF: no limitation with static standing activity Limitations to PLOF: limiting tolerance to standing static activity at waist to mid back level Mechanism of Injury: Mid back pain x 1 year incideous onset Pt notes hx of  pt fell off ladder Dec 2017 ladded flat on back onto deck , mild toward left side left hip wrist elbow intial fell aprox 8 feet main pain at time left low back pelvis , walking sharp pain through pelvic area , in PT for 2 months pt following therapy still had some pain but had plateaued then slowly over 6 wks or so went away , 
current pain more mid back  started March / April time frame 2018 pain gradually came on  
 pt feels mid back pain is dull and broad worse if standing and working flexed forward pain across bottom shoulder blade level , doing cooking prep , any activity pull upper back forward pain but not when lift weight at gym or go walking feels better when sitting resting PT goal : Pt would like to verify this is indeed muscleskeletal vs referred pain from this diverticulum of his stomach Current symptoms/Complaints: flareup of pain just below shoulder blades and  toward left side of T10 level - usually when static posture activity of standing and slightly flexed activity approx 45 min Previous Treatment/Compliance: therapy for initial post fall from ladder for left pelvic pain this is different location and different type of pain PMHx/Surgical Hx: pt has had 30# loss weight but this was intentional approx last spring , thyroid ( pt had been seeing Dr for soreness with swallowing and hoarsness , being treated for GERD then sent to ENT , pt found a diverticulum in stomach on top , pt states currently actively still being evaluated and wants to make sure this sx is not also contributing to mid back pain  ) Xray : thoracic xray Work Hx: non employed retired , active individual working out , boating , 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  
 
 
25 min Therapeutic Exercise:  [x] See flow sheet :  
Rationale: increase ROM and increase proprioception and posture awareness  to improve the patients ability to tolerated standing static activity in front of him  
 
8 min Manual Therapy:  Manipulation to T4, T7 , T9 approx levels  With reasessments following and set up Rationale: decrease pain, increase ROM and increase tissue extensibility to promote full functional mobility With 
 [] TE 
 [] TA 
 [] neuro 
 [] other: Patient Education: [x] Review HEP [] Progressed/Changed HEP based on: [] positioning   [] body mechanics   [] transfers   [] heat/ice application   
[] other:   
 
Other Objective/Functional Measures: FOTO : 61/100 Physical Therapy Evaluation - Lumbar Spine ([facility]) SUBJECTIVE Chief Complaint: mid back T7-approx T10 level pain intermittent with standing and doing activity in front of him Mechanism of injury:incideous onset Aggravated by: standing flexed mild forward doing activity infront of him Eased by:  
 [] Bending [x] Sitting [] Standing [x] Walking 
 [x] Moving [] AM  [] PM  Lying: [x] sup  [] pro  [] sidelying 
 [] Other: 
  
General Health:  Red Flags Indicated? [] Yes    [] No 
[] Yes [] No Recent weight change (If yes, due to dieting? [] Yes  [] No) [] Yes [x] No Weakness in legs during walking 
[] Yes [x] No Unremitting pain at night 
[x] Yes [] No Abdominal problems ( pt notes being seen for esophagous and stomach issue ) [] Yes [x] No Rectal bleeding Past History/Treatments: PT in past for low back pelvis from fall dec 2017 Diagnostic Tests: [] Lab work [x] X-rays    [] CT [] MRI     [] Other: 
Results:\" mild multilevel lumbar spondylosis , most notable at L5-S1\" Mild levo curvature of lower thoracic spine , no acute fx or subluxation body height maintained small multilevel anterior osteophytes lungs clear . Functional Status Prior level of function: no pain with static standing Present functional limitations:static standing with activity in front 45 min OBJECTIVEPosture: alignment : = inferior scap , noted mild left scoliosis type curve Approx T7 to 10 Mild left shift resulting T4-T10 mild left rotation ( more = following manual )  , = crest = PSIS , increase kyphosis , mild forward shoulder head posture Kyphosis: [x] Increased [] Decreased   []  WNL Lordosis:  [] Increased [] Decreased   [x] WNL Pelvic symmetry: [x] WNL    [] Other: 
 
Gait:  [x] Normal     [] Abnormal: Active Movements: [] N/A   [] Too acute   [] Other: ROM % AROM Comments:pain, area Forward flexion 40-60 Fingers to mid shin  1x no pain 10x low back mild discomfort upon return Extension 20-30 11 deg  1x stiff movement no change in back pain 10x mild aggrev in upper back , no worse when stop eases quickly SB right 20-30 22 SB left 20-30 23 Rotation right  Sitting upper trunk rotation 40 deg Rotation left  68 deg Neuro Screen [] WNL Reflexes not tested , sensation WNL bilaterally Dural Mobility:mild pull left median nerve Palpation no ttp mid back [] Min  [] Mod  [] Severe    Location: 
[] Min  [] Mod  [] Severe    Location: 
[] Min  [] Mod  [] Severe    Location: 
 
Strength Shoulder  ROM Left  Right  Strength 
bilaterally Flexion  165/170 152/160 5  
abd  180 180 5 Extension  80 80 5 IR  Reach spine scap  Same  5  
ER = reach base neck  Same  5 Other tests/comments: HEP taught and performed  
 sitting repeated mobility to easier left side , recheck right rotation noted increase motion and left pull on left following this ex increase rotation from 40 deg to 65 deg . Standing flexion UE stiffer on right , performed 10 x on left recheck right noted looser Sitting leg raise stiffer on left , performed 10x onright but no change to left when rechecked , performed 3x20 sec hold with right SLR noted looser on left following repeated mobility Sitting trunk extension over back of chair Discussed posture affects on his pain , break up with upper back extension when performing tasks in standing with activity infront waist level and to utilize foot on mild step to weight shift pressure to feet instead of back Pain Level (0-10 scale) post treatment: 0 
 
ASSESSMENT/Changes in Function: Patient is an active motivated 70 y. o.male who presents to PT with Dorsalgia, unspecified [M54.9].   Mid back pain with standing static position performing tasks infront of him. Noted mild left curve at approx T7 to T10 increase kyphosis round shoulder and forward head posture. Noted left rotation alingment approx T4 to T10 which responded well to manual tech manipulation to these areas. Other findings : Very mild pull along median nerve on left UE , ROM UE WNL except mild tight flexion on right , noted tight trunk rotation right , and trunk flexion extension. Gross strength UE 5/5. The patient will benefit from skilled PT services to address these deficits and facilitate return to full activity level and improved quality of life. Patient will continue to benefit from skilled PT services to modify and progress therapeutic interventions, address functional mobility deficits, address ROM deficits, address strength deficits, analyze and address soft tissue restrictions, analyze and cue movement patterns, analyze and modify body mechanics/ergonomics and assess and modify postural abnormalities to attain remaining goals. [x]  See Plan of Care 
[]  See progress note/recertification 
[]  See Discharge Summary Progress towards goals / Updated goals: 
Short Term Goals: STG- To be accomplished in 3 week(s): 1. Pt will be compliant with HEP for max progression toward all goals and return to PLOF. Eval:started on HEP and discussed posture affects on pain Current: NA 2. Pt pain will improve >= 40% to allow pt improved tolerance to daily activity specifically those performed in static  standing with active at waist level Eval:pain up to 6-7 intermittent Current: NA 
 
3. Pt FOTO score will increase by >=5 points to show improvement in functional mobility. Eval:61/100 Current: will address at visit 5 Long Term Goals: LTG- To be accomplished in 6 week(s): 1. Pt will be independant with HEP for continued and ongoing progression following discharge toward full functional mobility. Eval:started on HEP and education Current: NA 2. Pt pain will improve >= 80% to allow pt return to PLOF. Eval:pain up to 6-7 Current: NA 
 
3. Pt FOTO score will increase by >=10  points to show improvement in functional mobility. Eval:67/100 Current: will reassess every 5th visit 5. Pt ROM will improve to full UE flexion right to = left , trunk rotation = bialterally  to allow pt to reach up fully , maintain ful  trunk mobility without catch mid back Eval: right shoulder flexion 152/160 , left 165/170 , sit trunk rotation initially left 68 right 40 Current: NA 
 
6. Pt will be able to demonstrate upright posture shoulder back positioning and adaptions to lessen pain at mid back during static standing with activity infront of him Eval:forward shoulder head posture with increase kyphosis Current: NA 
 
 
 
PLAN [x]  Upgrade activities as tolerated     [x]  Continue plan of care 
[]  Update interventions per flow sheet      
[]  Discharge due to:_ 
[]  Other:_ Levi Caballero, PT 2/1/2019  10:18 AM

## 2019-02-01 NOTE — PROGRESS NOTES
[facility] at [address] [place] [address], [address] [place] [address] Phone: [phone]   Fax: [phone] Plan of Care/ Statement of Necessity for Physical Therapy Services Patient name: Elsa Hall Start of Care: 2019 Referral source: Yuliana Rivera MD : 1955 Medical Diagnosis: Dorsalgia, unspecified [M54.9] Onset Date: 1 year Treatment Diagnosis:  Mid back pain Prior Hospitalization: see medical history Provider#: [id number] Medications: Verified on Patient summary List  
 Comorbidities:  Thyroid , previous low back pain , treatment for stomach / potential reflux issues Prior Level of Function: no limitation pt is active working out 3-4 x week , swims weight training , pilates The Plan of Care and following information is based on the information from the initial evaluation. Assessment/ key information: Patient is an active motivated 70 y. o.male who presents to PT with Dorsalgia, unspecified [M54.9]. Mid back pain with standing static position performing tasks infront of him. Noted mild left curve at approx T7 to T10 increase kyphosis round shoulder and forward head posture. Noted left rotation alingment approx T4 to T10 which responded well to manual tech manipulation to these areas. Other findings : Very mild pull along median nerve on left UE , ROM UE WNL except mild tight flexion on right , noted tight trunk rotation right , and trunk flexion extension mild limited. Gross strength UE 5/5. The patient will benefit from skilled PT services to address these deficits and facilitate return to full activity level and improved quality of life.  
 
Evaluation Complexity History MEDIUM  Complexity : 1-2 comorbidities / personal factors will impact the outcome/ POC ; Examination MEDIUM Complexity : 3 Standardized tests and measures addressing body structure, function, activity limitation and / or participation in recreation ;Presentation MEDIUM Complexity : Evolving with changing characteristics  ; Clinical Decision Making LOW Complexity : FOTO score of  Overall Complexity Rating: LOW Problem List: pain affecting function, decrease ROM, decrease strength, decrease ADL/ functional abilitiies, decrease activity tolerance and decrease flexibility/ joint mobility Treatment Plan may include any combination of the following: Therapeutic exercise, Therapeutic activities, Neuromuscular re-education, Physical agent/modality, Manual therapy, Patient education and Functional mobility training Patient / Family readiness to learn indicated by: asking questions, trying to perform skills and interest 
Persons(s) to be included in education: patient (P) Barriers to Learning/Limitations: None Patient Goal (s): determine source of pain and figure out how to prevent it from reocurring Patient Self Reported Health Status: excellent Rehabilitation Potential: good Short Term Goals: To be accomplished in 3 weeks: 1. Pt will be compliant with HEP for max progression toward all goals and return to PLOF. Eval:started on HEP and discussed posture affects on pain Current: NA 
  
2.Pt pain will improve >= 40% to allow pt improved tolerance to daily activity specifically those performed in static  standing with active at waist level Eval:pain up to 6-7 intermittent Current: NA 
  
3. Pt FOTO score will increase by >=5 points to show improvement in functional mobility. Eval:61/100 Current: will address at visit 5 Long Term Goals: To be accomplished in 6 weeks: 1. Pt will be independant with HEP for continued and ongoing progression following discharge toward full functional mobility. Eval:started on HEP and education Current: NA 
  
2.Pt pain will improve >= 80% to allow pt return to PLOF. Eval:pain up to 6-7 Current: NA 
  
3. Pt FOTO score will increase by >=10  points to show improvement in functional mobility. Eval:67/100 Current: will reassess every 5th visit  
  
4 Pt ROM will improve to full UE flexion right to = left , trunk rotation = bialterally  to allow pt to reach up fully , maintain ful  trunk mobility without catch mid back Eval: right shoulder flexion 152/160 , left 165/170 , sit trunk rotation initially left 68 right 40 Current: NA 
  
5.  Pt will be able to demonstrate upright posture shoulder back positioning and adaptions to lessen pain at mid back during static standing with activity infront of him Eval:forward shoulder head posture with increase kyphosis Current: NA 
  
Certification period : 2/1/2019 - 3/30/2019 Frequency / Duration: Patient to be seen 2 times per week for 6 weeks. Patient/ Caregiver education and instruction: Diagnosis, prognosis, self care, activity modification and exercises 
 [x]  Plan of care has been reviewed with JESE Edward, PT 2/1/2019 12:07 PM 
_____________________________________________________________________ I certify that the above Therapy Services are being furnished while the patient is under my care. I agree with the treatment plan and certify that this therapy is necessary. [de-identified] Signature:____________Date:_________TIME:________ 
 
[employer], Date and Time must be completed for valid certification ** Please sign and return to [facility] at [address] Phone: [phone]   Fax: [phone]

## 2019-02-04 ENCOUNTER — HOSPITAL ENCOUNTER (OUTPATIENT)
Dept: PHYSICAL THERAPY | Age: 64
Discharge: HOME | End: 2019-02-04
Payer: MEDICARE

## 2019-02-04 PROCEDURE — 97110 THERAPEUTIC EXERCISES: CPT | Performed by: PHYSICAL THERAPIST

## 2019-02-04 PROCEDURE — 97140 MANUAL THERAPY 1/> REGIONS: CPT | Performed by: PHYSICAL THERAPIST

## 2019-02-04 NOTE — PROGRESS NOTES
PT DAILY TREATMENT NOTE - [facility]  Patient Name: Krista Blind Date:2019 : 1955 [x]  Patient  Verified Payor: [place] MED[place]ARE / Plan: Elpidio Worley [facility] / Product Type: Medicare / In [id number]  Out [id number] Total Treatment Time (min): 41 Total Timed Codes (min): 41 
1:1 treat : 41 Visit #: 2 of 12 Treatment Area: Dorsalgia, unspecified [M54.9] SUBJECTIVE Pain Level (0-10 scale): 0 Any medication changes, allergies to medications, adverse drug reactions, diagnosis change, or new procedure performed?: [x] No    [] Yes (see summary sheet for update) Subjective functional status/changes:   [] No changes reported Pt states did some food prep this weekend felt mild sore coming on stood and extended upper back eased soreness Also dug 50-60' of ditch this weekend tolerated well discussed watching posture OBJECTIVE 33 min Therapeutic Exercise:  [x] See flow sheet :  
Rationale: increase ROM and increase proprioception and posture awareness  to improve the patients ability to tolerated standing static activity in front of him  
  
8 min Manual Therapy:  Manipulation to T4, T7 , T9 approx levels  With reasessments following and set up Rationale: decrease pain, increase ROM and increase tissue extensibility to promote full functional mobility With 
 [] TE 
 [] TA 
 [] neuro 
 [] other: Patient Education: [x] Review HEP [] Progressed/Changed HEP based on:  
[] positioning   [] body mechanics   [] transfers   [] heat/ice application   
[] other:   
 
Other Objective/Functional Measures: T1-4 left rotation then T7-10  
 
manual : manipulation to 5 levels thoracic cervicothoracic junction , T2,4 ,7 10, MET for lower thoracic = alignment when done Pain Level (0-10 scale) post treatment: 0 
 
ASSESSMENT/Changes in Function: tolerated ex well no adverse affect, ROM in trunk improved this session , minimal cues with postural control with ex doing well , given green tband for HEP Patient will continue to benefit from skilled PT services to modify and progress therapeutic interventions, address functional mobility deficits, address ROM deficits, address strength deficits, analyze and address soft tissue restrictions, analyze and cue movement patterns, analyze and modify body mechanics/ergonomics and assess and modify postural abnormalities to attain remaining goals. [x]  See Plan of Care 
[]  See progress note/recertification 
[]  See Discharge Summary Progress towards goals / Updated goals: 
Short Term Goals: To be accomplished in 3 weeks: 1.  Pt will be compliant with HEP for max progression toward all goals and return to PLOF. Eval:started on HEP and discussed posture affects on pain  
Current: NA 
  
2.Pt pain will improve >= 40% to allow pt improved tolerance to daily activity specifically those performed in static  standing with active at waist level Eval:pain up to 6-7 intermittent  
Current: NA 
  
3. Pt FOTO score will increase by >=5 points to show improvement in functional mobility. Eval:61/100 Current: will address at visit 5 
  
  
Long Term Goals: To be accomplished in 6 weeks: 1.  Pt will be independant with HEP for continued and ongoing progression following discharge toward full functional mobility. Eval:started on HEP and education  
Current: NA 
  
2.Pt pain will improve >= 80% to allow pt return to PLOF. Eval:pain up to 6-7  
Current: NA 
  
3. Pt FOTO score will increase by >=10  points to show improvement in functional mobility. Eval:67/100  
Current: will reassess every 5th visit  
  
4 Pt ROM will improve to full UE flexion right to = left , trunk rotation = bialterally  to allow pt to reach up fully , maintain ful  trunk mobility without catch mid back Eval: right shoulder flexion 152/160 , left 165/170 , sit trunk rotation initially left 68 right 40  
Current: NA 
  
 5.  Pt will be able to demonstrate upright posture shoulder back positioning and adaptions to lessen pain at mid back during static standing with activity infront of him  
Eval:forward shoulder head posture with increase kyphosis  
Current: NA 
 
 
 
PLAN [x]  Upgrade activities as tolerated     [x]  Continue plan of care 
[]  Update interventions per flow sheet      
[]  Discharge due to:_ 
[]  Other:_ Tenisha Magallanes, PT 2/4/2019  8:01 AM 
 
Future Appointments Date Time Provider Zenon Dixon 2/8/2019 10:30 AM Jack Rangel PT, DPT [facility]  
2/12/2019  3:00 PM Jack Rangel PT, AURA CHOHPSIMA [facility]  
2/14/2019 10:00 AM Jack Rangel PT, DPMAURY [facility]  
2/18/2019 10:30 AM Jack Rangel PT, AURA CHOHPSIMA [facility]  
2/22/2019 10:00 AM Jack Rangel PT, DPMAURY CHOHPSIMA [facility]  
2/25/2019 10:30 AM Jack Rangel PT, DPMAURY [facility]  
2/28/2019 10:30 AM THE [facility] JACY CARTAGENA [facility]

## 2019-02-08 ENCOUNTER — HOSPITAL ENCOUNTER (OUTPATIENT)
Dept: PHYSICAL THERAPY | Age: 64
Discharge: HOME | End: 2019-02-08
Payer: MEDICARE

## 2019-02-08 PROCEDURE — 97110 THERAPEUTIC EXERCISES: CPT | Performed by: PHYSICAL THERAPIST

## 2019-02-08 NOTE — PROGRESS NOTES
PT DAILY TREATMENT NOTE - [facility]  Patient Name: Davida Lindsey Date:2019 : 1955 [x]  Patient  Verified Payor: [place] MEDICARE / Plan: Elpidio Worley [facility] / Product Type: Medicare / In time:10:30  Out time:11:15 Total Treatment Time (min): 45 Total Timed Codes (min): 45 
1:1 Treatment Time ( [address] only): 30 Visit #: 3 of 12 Treatment Area: Dorsalgia, unspecified [M54.9] SUBJECTIVE Pain Level (0-10 scale): 0 Any medication changes, allergies to medications, adverse drug reactions, diagnosis change, or new procedure performed?: [x] No    [] Yes (see summary sheet for update) Subjective functional status/changes:   [] No changes reported Feels fine. The pain is only there when in static positions. OBJECTIVE 30 min Therapeutic Exercise:  [x] See flow sheet :  
Rationale: increase ROM, increase strength and decrease pain to improve the patients ability to complete ADLs With 
 [] TE 
 [] TA 
 [] neuro 
 [] other: Patient Education: [x] Review HEP [] Progressed/Changed HEP based on:  
[] positioning   [] body mechanics   [] transfers   [] heat/ice application   
[] other:   
 
Other Objective/Functional Measures: NA  
 
Pain Level (0-10 scale) post treatment: 0 
 
ASSESSMENT/Changes in Function: Patient responds well to treatment session. Minimal difficulty with exercises as prescribed. Symptoms suggest pain is related to static postures. Advised to modify postures as needed. No adverse effects were noted from today's treatment session Patient will continue to benefit from skilled PT services to modify and progress therapeutic interventions, address functional mobility deficits, address ROM deficits, address strength deficits, analyze and address soft tissue restrictions, analyze and cue movement patterns, analyze and modify body mechanics/ergonomics, assess and modify postural abnormalities []  See Plan of Care 
[]  See progress note/recertification []  See Discharge Summary Progress towards goals / Updated goals: 
Short Term Goals: To be accomplished in 3 weeks: 1.  Pt will be compliant with HEP for max progression toward all goals and return to PLOF. Eval:started on HEP and discussed posture affects on pain  
Current: NA 
  
2.Pt pain will improve >= 40% to allow pt improved tolerance to daily activity specifically those performed in static  standing with active at waist level Eval:pain up to 6-7 intermittent  
Current: NA 
  
3. Pt FOTO score will increase by >=5 points to show improvement in functional mobility. Eval:61/100 Current: will address at visit 5 
  
  
Long Term Goals: To be accomplished in 6 weeks: 1.  Pt will be independant with HEP for continued and ongoing progression following discharge toward full functional mobility. Eval:started on HEP and education  
Current: NA 
  
2.Pt pain will improve >= 80% to allow pt return to PLOF. Eval:pain up to 6-7  
Current: NA 
  
3. Pt FOTO score will increase by >=10  points to show improvement in functional mobility. Eval:67/100  
Current: will reassess every 5th visit  
  
4 Pt ROM will improve to full UE flexion right to = left , trunk rotation = bialterally  to allow pt to reach up fully , maintain ful  trunk mobility without catch mid back Eval: right shoulder flexion 152/160 , left 165/170 , sit trunk rotation initially left 68 right 40  
Current: NA 
  
5.  Pt will be able to demonstrate upright posture shoulder back positioning and adaptions to lessen pain at mid back during static standing with activity infront of him  
Eval:forward shoulder head posture with increase kyphosis  
Current: NA 
 
 
 
PLAN 
[]  Upgrade activities as tolerated     [x]  Continue plan of care 
[]  Update interventions per flow sheet      
[]  Discharge due to:_ 
[]  Other:_ Amanda Hernandez, PT, DPT 2/8/2019  10:48 AM 
 
Future Appointments Date Time Provider Zenon Dixon 2/12/2019  3:00 PM Nisreen Barbosa PT, DPT [facility]  
2/14/2019 10:00 AM Lindsay Orozco PT [facility]  
2/20/2019 11:00 AM Nisreen Barbosa PT, DPT [facility]  
2/22/2019 10:00 AM Nisreen Barbosa PT, DPT [facility]  
2/25/2019 10:30 AM Nisreen Barbosa PT, DPT [facility]  
2/28/2019 10:30 AM Nisreen Barbosa PT, DPT [facility]

## 2019-02-12 ENCOUNTER — HOSPITAL ENCOUNTER (OUTPATIENT)
Dept: PHYSICAL THERAPY | Age: 64
Discharge: HOME | End: 2019-02-12
Payer: MEDICARE

## 2019-02-12 PROCEDURE — 97110 THERAPEUTIC EXERCISES: CPT | Performed by: PHYSICAL THERAPIST

## 2019-02-12 NOTE — PROGRESS NOTES
PT DAILY TREATMENT NOTE - [facility]  Patient Name: Pilar Omer Date:2019 : 1955 [x]  Patient  Verified Payor: [place] MEDICARE / Plan: Elpidio Herreray / Product Type: Medicare / In time:3:30  Out time:3:31 Total Treatment Time (min): 31 Total Timed Codes (min): 31 
1:1 Treatment Time ( only): 23 Visit #: 4 of 12 Treatment Area: Dorsalgia, unspecified [M54.9] SUBJECTIVE Pain Level (0-10 scale): 0 Any medication changes, allergies to medications, adverse drug reactions, diagnosis change, or new procedure performed?: [x] No    [] Yes (see summary sheet for update) Subjective functional status/changes:   [] No changes reported Continues to have dull, discomfort when standing for several mins at a time OBJECTIVE 23 min Therapeutic Exercise:  [x] See flow sheet :  
Rationale: increase ROM, increase strength and decrease pain to improve the patients ability to complete ADLs With 
 [] TE 
 [] TA 
 [] neuro 
 [] other: Patient Education: [x] Review HEP [] Progressed/Changed HEP based on:  
[] positioning   [] body mechanics   [] transfers   [] heat/ice application   
[] other:   
 
Other Objective/Functional Measures: NA  
 
Pain Level (0-10 scale) post treatment: 0 
 
ASSESSMENT/Changes in Function: Patient responds well to treatment session. Pain onset after about 5mins of standing. Appears to feel better after some movement. No TTP in local muscular tissues. Discussed progressing into heavier resistance exercises (I.e. Deadlifts) next session. Patient was receptive. No adverse effects were noted from today's treatment session Patient will continue to benefit from skilled PT services to modify and progress therapeutic interventions, address functional mobility deficits, address ROM deficits, address strength deficits, analyze and address soft tissue restrictions, analyze and cue movement patterns, analyze and modify body mechanics/ergonomics, assess and modify postural abnormalities []  See Plan of Care 
[]  See progress note/recertification 
[]  See Discharge Summary Progress towards goals / Updated goals: 
Short Term Goals: To be accomplished in 3 weeks: 1.  Pt will be compliant with HEP for max progression toward all goals and return to PLOF. Eval:started on HEP and discussed posture affects on pain  
Current: NA 
  
2.Pt pain will improve >= 40% to allow pt improved tolerance to daily activity specifically those performed in static  standing with active at waist level Eval:pain up to 6-7 intermittent  
Current: NA 
  
3. Pt FOTO score will increase by >=5 points to show improvement in functional mobility. Eval:61/100 Current: will address at visit 5 
  
  
Long Term Goals: To be accomplished in 6 weeks: 1.  Pt will be independant with HEP for continued and ongoing progression following discharge toward full functional mobility. Eval:started on HEP and education  
Current: NA 
  
2.Pt pain will improve >= 80% to allow pt return to PLOF. Eval:pain up to 6-7  
Current: NA 
  
3. Pt FOTO score will increase by >=10  points to show improvement in functional mobility. Eval:67/100  
Current: will reassess every 5th visit  
  
4 Pt ROM will improve to full UE flexion right to = left , trunk rotation = bialterally  to allow pt to reach up fully , maintain ful  trunk mobility without catch mid back Eval: right shoulder flexion 152/160 , left 165/170 , sit trunk rotation initially left 68 right 40  
Current: NA 
  
5.  Pt will be able to demonstrate upright posture shoulder back positioning and adaptions to lessen pain at mid back during static standing with activity infront of him  
Eval:forward shoulder head posture with increase kyphosis  
Current: NA 
  
 
 
 
PLAN 
[]  Upgrade activities as tolerated     [x]  Continue plan of care 
[]  Update interventions per flow sheet      
[]  Discharge due to:_ 
 []  Other:_ Ervin Mckeon PT, DPT 2/12/2019  3:01 PM 
 
Future Appointments Date Time Provider Zenon Dixon 2/14/2019 10:00 AM Alisia [address] [place] [address] [facility]  
2/20/2019 11:00 AM Jack Rangel PT, DPT [facility]  
2/22/2019 10:00 AM Jack Rangel PT, AURA CARTAGENA [facility]  
2/25/2019 10:30 AM Jack Rangel PT, DPT [facility]  
2/28/2019 10:30 AM Jack Rangel PT, AURA CARTAGENA [facility]

## 2019-02-14 ENCOUNTER — HOSPITAL ENCOUNTER (OUTPATIENT)
Dept: PHYSICAL THERAPY | Age: 64
Discharge: HOME | End: 2019-02-14
Payer: MEDICARE

## 2019-02-14 PROCEDURE — 97110 THERAPEUTIC EXERCISES: CPT

## 2019-02-14 NOTE — PROGRESS NOTES
PHYSICAL THERAPY - DAILY TREATMENT NOTE Patient Name: Carla Washington        Date: 2019 : 1955   yes Patient  Verified Visit #:     Insurance: Payor: Alice Freire / Plan: Elpidio Vasquez / Product Type: Medicare / In time: 10:10 Out time: 10:48 Total Treatment Time: 45 Medicare/BCBS Time Tracking (below) Total Timed Codes (min):  38 1:1 Treatment Time:  45 TREATMENT AREA =  Dorsalgia, unspecified [M54.9] SUBJECTIVE Pain Level (on 0 to 10 scale):  0  / 10 Medication Changes/New allergies or changes in medical history, any new surgeries or procedures?    no  If yes, update Summary List  
Subjective Functional Status/Changes:  []  No changes reported Im  Feeling good today OBJECTIVE 38 min Therapeutic Exercise:  [x]  See flow sheet Rationale:      increase ROM, increase strength and improve coordination to improve the patients ability to  Lift at home Billed With/As: 
 [x] TE 
 [] TA 
 [] Neuro 
 [] Self Care Patient Education: [x] Review HEP [] Progressed/Changed HEP based on:  
[] positioning   [] body mechanics   [] transfers   [] heat/ice application   
[] other:   
Other Objective/Functional Measures: Pt performed progression of functional activities. Post Treatment Pain Level (on 0 to 10) scale:   0  / 10 ASSESSMENT Assessment/Changes in Function:  
 
Pt demonstrates improved tolerance to functional activities []  See Progress Note/Recertification Patient will continue to benefit from skilled PT services to modify and progress therapeutic interventions, address functional mobility deficits, address ROM deficits and address strength deficits to attain remaining goals. Progress toward goals / Updated goals: 
.  Pt will be independant with HEP for continued and ongoing progression following discharge toward full functional mobility.   
Eval:started on HEP and education  
Current: NA 
  
 2.Pt pain will improve >= 80% to allow pt return to PLOF. Eval:pain up to 6-7  
Current: NA 
  
3. Pt FOTO score will increase by >=10  points to show improvement in functional mobility. Eval:67/100  
Current: will reassess every 5th visit  
  
4 Pt ROM will improve to full UE flexion right to = left , trunk rotation = bialterally  to allow pt to reach up fully , maintain ful  trunk mobility without catch mid back Eval: right shoulder flexion 152/160 , left 165/170 , sit trunk rotation initially left 68 right 40  
Current: NA 
  
5.  Pt will be able to demonstrate upright posture shoulder back positioning and adaptions to lessen pain at mid back during static standing with activity infront of him  
Eval:forward shoulder head posture with increase kyphosis  
Current: Progressing PLAN 
[]  Upgrade activities as tolerated yes Continue plan of care  
[]  Discharge due to :   
[]  Other:   
 
Therapist: Kasi Cristobal PT Date: 2/14/2019 Time: 12:35 PM  
 
Future Appointments Date Time Provider Zenon Dixon 2/20/2019 11:00 AM Aurelio Worthy, PT, DPT [facility]  
2/22/2019 10:00 AM Aurelio Worthy PT, DPT [facility]  
2/25/2019 10:30 AM Aurelio Worthy, PT, DPT [facility]  
2/28/2019 10:30 AM Aurelio Worthy PT, DPT [facility]

## 2019-02-18 ENCOUNTER — APPOINTMENT (OUTPATIENT)
Dept: PHYSICAL THERAPY | Age: 64
End: 2019-02-18
Payer: MEDICARE

## 2019-02-20 ENCOUNTER — HOSPITAL ENCOUNTER (OUTPATIENT)
Dept: PHYSICAL THERAPY | Age: 64
Discharge: HOME | End: 2019-02-20
Payer: MEDICARE

## 2019-02-20 PROCEDURE — 97110 THERAPEUTIC EXERCISES: CPT | Performed by: PHYSICAL THERAPIST

## 2019-02-20 NOTE — PROGRESS NOTES
PT DAILY TREATMENT NOTE  Patient Name: Carla Washington Date:2019 : 1955 [x]  Patient  Verified Payor: [place] MEDICARE / Plan: Elpidio Vasquez / Product Type: Medicare / In time:11:02  Out time:11:44 Total Treatment Time (min): 42 Visit #: 6 of 12 Treatment Area: Dorsalgia, unspecified [M54.9] SUBJECTIVE Pain Level (0-10 scale): 1 Any medication changes, allergies to medications, adverse drug reactions, diagnosis change, or new procedure performed?: [x] No    [] Yes (see summary sheet for update) Subjective functional status/changes:   [] No changes reported Feels okay. No new issues. The deadlifts were easy last time, weight was light OBJECTIVE 42 min Therapeutic Exercise:  [x] See flow sheet :  
Rationale: increase ROM, increase strength and decrease pain to improve the patients ability to complete ADLs With 
 [] TE 
 [] TA 
 [] neuro 
 [] other: Patient Education: [x] Review HEP [] Progressed/Changed HEP based on:  
[] positioning   [] body mechanics   [] transfers   [] heat/ice application   
[] other:   
 
Other Objective/Functional Measures: NA  
 
Pain Level (0-10 scale) post treatment: 0 
 
ASSESSMENT/Changes in Function: Patient responds well to treatment session. Patient is challenged with dead-lift. Requires cueing (verbal, tactile). Appears to respond well. Will need further work next session. . No adverse effects were noted from today's treatment session Patient will continue to benefit from skilled PT services to modify and progress therapeutic interventions, address functional mobility deficits, address ROM deficits, address strength deficits, analyze and address soft tissue restrictions, analyze and cue movement patterns, analyze and modify body mechanics/ergonomics, assess and modify postural abnormalities []  See Plan of Care 
[]  See progress note/recertification 
[]  See Discharge Summary Progress towards goals / Updated goals: 
.  Pt will be independant with HEP for continued and ongoing progression following discharge toward full functional mobility. Eval:started on HEP and education  
Current: NA 
  
2.Pt pain will improve >= 80% to allow pt return to PLOF. Eval:pain up to 6-7  
Current: NA 
  
3. Pt FOTO score will increase by >=10  points to show improvement in functional mobility. Eval:67/100  
Current: will reassess every 5th visit  
  
4 Pt ROM will improve to full UE flexion right to = left , trunk rotation = bialterally  to allow pt to reach up fully , maintain ful  trunk mobility without catch mid back Eval: right shoulder flexion 152/160 , left 165/170 , sit trunk rotation initially left 68 right 40  
Current: NA 
  
5.  Pt will be able to demonstrate upright posture shoulder back positioning and adaptions to lessen pain at mid back during static standing with activity infront of him  
Eval:forward shoulder head posture with increase kyphosis  
Current: Progressing PLAN 
[]  Upgrade activities as tolerated     [x]  Continue plan of care 
[]  Update interventions per flow sheet      
[]  Discharge due to:_ 
[]  Other:_ Christy Greene, PT, DPT 2/20/2019  12:34 PM 
 
Future Appointments Date Time Provider Zenon Dixon 2/28/2019 10:30 AM Jacques Sams, PT, DPT [facility]

## 2019-02-22 ENCOUNTER — APPOINTMENT (OUTPATIENT)
Dept: PHYSICAL THERAPY | Age: 64
End: 2019-02-22
Payer: MEDICARE

## 2019-02-25 ENCOUNTER — APPOINTMENT (OUTPATIENT)
Dept: PHYSICAL THERAPY | Age: 64
End: 2019-02-25
Payer: MEDICARE

## 2019-02-28 ENCOUNTER — HOSPITAL ENCOUNTER (OUTPATIENT)
Dept: PHYSICAL THERAPY | Age: 64
Discharge: HOME | End: 2019-02-28
Payer: MEDICARE

## 2019-02-28 PROCEDURE — 97110 THERAPEUTIC EXERCISES: CPT | Performed by: PHYSICAL THERAPIST

## 2019-02-28 NOTE — PROGRESS NOTES
[facility] at [address]: [phone]   Fax: [phone] Discharge Summary Patient Name: Ashley Chairez : 1955 Medical  
Diagnosis: Dorsalgia, unspecified [M54.9] Treatment Diagnosis: Thoracic back pain Onset Date: 1 year Referral Source: Alfonzo Stone MD Start of Care [facility]): 2019 Prior Hospitalization: See medical history Provider #: [id number] Prior Level of Function: Active, gym exercise, swimming Comorbidities:  hypothyroid Medications: Verified on Patient Summary List  
  
=========================================================================================== Assessment / Summary of Care:  Ashley Chairez is a 70 y.o.  yo male with thoracic back pain. Patient has made good progress to date. Meeting most goals. Is capable of progressing to independent HEP at this time. No adverse effects were noted from today's treatment session 
 
=========================================================================================== 
 
Plan: Discharge to independent HEP. Goals: Pt will be independant with HEP for continued and ongoing progression following discharge toward full functional mobility. Eval:started on HEP and education  
Current: reports compliance, MET 2019 
  
2. Pt pain will improve >= 80% to allow pt return to PLOF. Eval:pain up to 6-7  
Current: no pain MET 2019 
  
3. Pt FOTO score will increase by >=10  points to show improvement in functional mobility. Eval:67/100  
Current: 97 MET 2019 
  
4 Pt ROM will improve to full UE flexion right to = left , trunk rotation = bialterally  to allow pt to reach up fully , maintain ful  trunk mobility without catch mid back Eval: right shoulder flexion 152/160 , left 165/170 , sit trunk rotation initially left 68 right 40  
Current: ROM WNL MET 2019 
  
5.  Pt will be able to demonstrate upright posture shoulder back positioning and adaptions to lessen pain at mid back during static standing with activity infront of him  
Eval:forward shoulder head posture with increase kyphosis  
Current: MET 2/28/2019 
 
 
=========================================================================================== 
Subjective: Feels good. Like he's ready to be discharged. Objective: See Above Therapist Signature: Blaire Shetty PT, DPT, RAUDEL Date: 2/28/2019   Time: 11:39 AM

## 2019-02-28 NOTE — PROGRESS NOTES
PT DAILY TREATMENT NOTE - [facility]  Patient Name: Darrell Peña Date:2019 : 1955 [x]  Patient  [place] / Plan: Elpidio Worley [facility] / Product Type: Medicare / In time:10:28  Out time:11:14 Total Treatment Time (min): 46 Total Timed Codes (min): 46 
1:1 Treatment Time ( [address] only): 46 Visit #: 7 of 12 Treatment Area: Dorsalgia, unspecified [M54.9] SUBJECTIVE Pain Level (0-10 scale): 0 Any medication changes, allergies to medications, adverse drug reactions, diagnosis change, or new procedure performed?: [x] No    [] Yes (see summary sheet for update) Subjective functional status/changes:   [] No changes reported Feels good. Like he's ready to be discharged. OBJECTIVE 46 min Therapeutic Exercise:  [x] See flow sheet :  
Rationale: increase ROM, increase strength and decrease pain to improve the patients ability to complete ADLs With 
 [] TE 
 [] TA 
 [] neuro 
 [] other: Patient Education: [x] Review HEP [] Progressed/Changed HEP based on:  
[] positioning   [] body mechanics   [] transfers   [] heat/ice application   
[] other:   
 
Other Objective/Functional Measures: NA  
 
Pain Level (0-10 scale) post treatment: 0 
 
ASSESSMENT/Changes in Function: Patient responds well to treatment session. Patient has made good progress to date. Meeting most goals. Is capable of progressing to independent HEP at this time. No adverse effects were noted from today's treatment session 
  
[]  See Plan of Care 
[]  See progress note/recertification 
[x]  See Discharge Summary Progress towards goals / Updated goals: 
 Pt will be independant with HEP for continued and ongoing progression following discharge toward full functional mobility. Eval:started on HEP and education  
Current: reports compliance, MET 2019 
  
2. Pt pain will improve >= 80% to allow pt return to PLOF.   
Eval:pain up to 6-7  
Current: no pain MET 2019 
  
 3. Pt FOTO score will increase by >=10  points to show improvement in functional mobility. Eval:67/100  
Current: 97 MET 2/28/2019 
  
4 Pt ROM will improve to full UE flexion right to = left , trunk rotation = bialterally  to allow pt to reach up fully , maintain ful  trunk mobility without catch mid back Eval: right shoulder flexion 152/160 , left 165/170 , sit trunk rotation initially left 68 right 40  
Current: ROM WNL MET 2/28/2019 
  
5.  Pt will be able to demonstrate upright posture shoulder back positioning and adaptions to lessen pain at mid back during static standing with activity infront of him  
Eval:forward shoulder head posture with increase kyphosis  
Current: MET 2/28/2019 PLAN 
[]  Upgrade activities as tolerated     []  Continue plan of care 
[]  Update interventions per flow sheet [x]  Discharge due to:_ 
[]  Other:_ Matthew Ferraro PT, DPT 2/28/2019  10:30 AM 
 
No future appointments.

## 2025-03-25 ENCOUNTER — HOSPITAL ENCOUNTER (OUTPATIENT)
Facility: HOSPITAL | Age: 70
Setting detail: RECURRING SERIES
Discharge: HOME | End: 2025-03-28
Payer: MEDICARE

## 2025-03-25 PROCEDURE — 97161 PT EVAL LOW COMPLEX 20 MIN: CPT

## 2025-03-25 PROCEDURE — 97110 THERAPEUTIC EXERCISES: CPT

## 2025-03-25 NOTE — PROGRESS NOTES
[facility] at [facility]  [address] [address]  Phone: [phone]   Fax: [phone]    Plan of Care/ Statement of Necessity for Physical Therapy Services        Patient name: Hua Simms Start of Care: 3/25/2025   Referral source: Johnny Santana MD : 1955    Medical Diagnosis: Superior glenoid labrum lesion of right shoulder, initial encounter  Pain in right shoulder Onset Date:25    Treatment Diagnosis:  M25.511  RIGHT SHOULDER PAIN                                          Prior Hospitalization: see medical history Provider#: [id number]     Comorbidities: Musculoskeletal disorders    Prior Level of Function: right TKA, thyroid disease      The Plan of Care and following information is based on the information from the initial evaluation.  Assessment/ key information: Pt is a 68 yo male who presents to [facility] with c/o right shoulder pain with acute onset while pulling his dog about 2 months ago noting continues pain with lifting his arm out to the side and intermittently with lifting tasks. Pt presents with sign and symptoms consistent with right rotator cuff pathology with some mixed indications of possible labral involvement with associated (+) right shoulder ER, IR, flexion weakness with pain, minimal terminal shoulder elevation AROM limitations, borderline (+) ER lag sign, pain with (+) Speed's test. Pt will benefit from skilled PT to address their impairments and facilitate improved functional status.     Evaluation Complexity:  History:  MEDIUM  Complexity : 1-2 comorbidities / personal factors will impact the outcome/ POC ; Examination:  MEDIUM Complexity : 3 Standardized tests and measures addressin body structure, function, activity limitation and / or participation in recreation  ;Presentation:  LOW Complexity : Stable, uncomplicated  ;Clinical Decision Making:  QuickDASH: Disability Arm, Shoulder, Hand = 38.6 % ; (0% - 40% Normal to Mild

## 2025-03-25 NOTE — PROGRESS NOTES
PHYSICAL THERAPY Upper Extremity Evaluation/Daily Note     Patient Name: Hua Simms    Date: 3/25/2025    : 1955  Insurance: Payor: MEDICARE / Plan: MEDICARE PART A AND B / Product Type: *No Product type* /      Patient  verified yes     Visit #   Current / Total 1 16   Time   In / Out 1:33 pm 2:08 pm   Pain   In / Out 1-2 1   Subjective Functional Status/Changes: Pt is a 69 year old male presenting with c/o right shoulder pain after walking a large dog and having two other dogs run out at them about 2 months ago. States he restrained the dog and hurt his shoulder when pulling back on the leash. States he has use voltaren, otc pain medication. Notes moving the arm out and away will recreate the sharp pain in his right anterolateral shoulder. States he has not noticed numbness, tingling, or weakness. X-ray unremarkable of right shoulder.    Changes to:  Meds, Allergies, Med Hx, Sx Hx?  If yes, update Summary List no       “If an interpreting service was utilized for treatment of this patient, the contents of this document represent the material reviewed with the patient via the .”    TREATMENT AREA =  Superior glenoid labrum lesion of right shoulder, initial encounter [S45.980H]    SUBJECTIVE  PLOF: functionally independent, no AD, active lifestyle, exercises at the gym a few days a week.   Limitations to PLOF: limited reaching out to right side, pushing out to right, occasional pain with lifting tasks  Mechanism of Injury: acute onset ~25  Current symptoms/Complaints: 2/10 at best with rest; 8/10 at worst; pt reports they are able to sleep through the night secondary to pain  Previous Treatment/Compliance: OTC pain medication and voltaren  Imaging/Diagnostic Testing: x-rays as above  PMHx/Surgical Hx: right TKA,   Work Hx: see intake  Living Situation:   Pt Goals: \"Pain Reduction, Strengthen Joint\"  Barriers: []pain []financial []time []transportation []other  Motivation: appears

## 2025-03-27 ENCOUNTER — HOSPITAL ENCOUNTER (OUTPATIENT)
Facility: HOSPITAL | Age: 70
Setting detail: RECURRING SERIES
Discharge: HOME | End: 2025-03-30
Payer: MEDICARE

## 2025-03-27 PROCEDURE — 97530 THERAPEUTIC ACTIVITIES: CPT

## 2025-03-27 PROCEDURE — 97112 NEUROMUSCULAR REEDUCATION: CPT

## 2025-03-27 PROCEDURE — 97110 THERAPEUTIC EXERCISES: CPT

## 2025-03-27 NOTE — PROGRESS NOTES
PHYSICAL / OCCUPATIONAL THERAPY - DAILY TREATMENT NOTE (updated )    Patient Name: Hua Simms    Date: 3/27/2025    : 1955  Insurance: Payor: MEDICARE / Plan: MEDICARE PART A AND B / Product Type: *No Product type* /      Patient  verified Yes     Visit #   Current / Total 2 16   Time   In / Out [phone]   Pain   In / Out 1-2 1   Subjective Functional Status/Changes: \"I have started doing the home exercises and they are going alright so far.\"   Changes to:  Meds, Allergies, Med Hx, Sx Hx?  If yes, update Summary List no       TREATMENT AREA =  Superior glenoid labrum lesion of right shoulder, initial encounter [S43.431A]  Pain in right shoulder [M25.511]    If an interpreting service is utilized for treatment of this patient, the contents of this document represent the material reviewed with the patient via the .     OBJECTIVE    Therapeutic Procedures:  Tx Min Billable or 1:1 Min (if diff from Tx Min) Procedure, Rationale, Specifics   16  [id number] Therapeutic Exercise (timed):  increase ROM, strength, coordination, balance, and proprioception to improve patient's ability to progress to PLOF and address remaining functional goals. (see flow sheet as applicable)     Details if applicable:       15  77572 Therapeutic Activity (timed):  use of dynamic activities replicating functional movements to increase ROM, strength, coordination, balance, and proprioception in order to improve patient's ability to progress to PLOF and address remaining functional goals.  (see flow sheet as applicable)     Details if applicable:     15  33315 Neuromuscular Re-Education (timed):  improve balance, coordination, kinesthetic sense, posture, core stability and proprioception to improve patient's ability to develop conscious control of individual muscles and awareness of position of extremities in order to progress to PLOF and address remaining functional goals. (see flow sheet as applicable)     Details if

## 2025-04-21 ENCOUNTER — HOSPITAL ENCOUNTER (OUTPATIENT)
Facility: HOSPITAL | Age: 70
Setting detail: RECURRING SERIES
Discharge: HOME | End: 2025-04-24
Payer: MEDICARE

## 2025-04-21 PROCEDURE — 97110 THERAPEUTIC EXERCISES: CPT

## 2025-04-21 PROCEDURE — 97530 THERAPEUTIC ACTIVITIES: CPT

## 2025-04-21 PROCEDURE — 97112 NEUROMUSCULAR REEDUCATION: CPT

## 2025-04-21 NOTE — PROGRESS NOTES
PHYSICAL / OCCUPATIONAL THERAPY - DAILY TREATMENT NOTE (updated )    Patient Name: Hua Simms    Date: 2025    : 1955  Insurance: Payor: MEDICARE / Plan: MEDICARE PART A AND B / Product Type: *No Product type* /      Patient  verified Yes     Visit #   Current / Total 3 16   Time   In / Out 8:49 am 9:29 am   Pain   In / Out 0 0   Subjective Functional Status/Changes: \"It's doing a little better.\" Pt notes he still has pain with lifting his arm out against resistance. Notes he had a shoulder injury about 10+ years ago that he had forgotten about when lifting a bag. States he did his exercises pretty consistently while on vacation.    Changes to:  Meds, Allergies, Med Hx, Sx Hx?  If yes, update Summary List no       TREATMENT AREA =  Superior glenoid labrum lesion of right shoulder, initial encounter [S43.431A]  Pain in right shoulder [M25.511]    If an interpreting service is utilized for treatment of this patient, the contents of this document represent the material reviewed with the patient via the .     OBJECTIVE    Therapeutic Procedures:  Tx Min Billable or 1:1 Min (if diff from Tx Min) Procedure, Rationale, Specifics   15  15276 Therapeutic Exercise (timed):  increase ROM, strength, coordination, balance, and proprioception to improve patient's ability to progress to PLOF and address remaining functional goals. (see flow sheet as applicable)     Details if applicable:       10  87284 Therapeutic Activity (timed):  use of dynamic activities replicating functional movements to increase ROM, strength, coordination, balance, and proprioception in order to improve patient's ability to progress to PLOF and address remaining functional goals.  (see flow sheet as applicable)     Details if applicable:     15  98929 Neuromuscular Re-Education (timed):  improve balance, coordination, kinesthetic sense, posture, core stability and proprioception to improve patient's ability to develop

## 2025-04-24 ENCOUNTER — HOSPITAL ENCOUNTER (OUTPATIENT)
Facility: HOSPITAL | Age: 70
Setting detail: RECURRING SERIES
Discharge: HOME | End: 2025-04-27
Payer: MEDICARE

## 2025-04-24 PROCEDURE — 97112 NEUROMUSCULAR REEDUCATION: CPT

## 2025-04-24 PROCEDURE — 97530 THERAPEUTIC ACTIVITIES: CPT

## 2025-04-24 PROCEDURE — 97110 THERAPEUTIC EXERCISES: CPT

## 2025-04-24 NOTE — PROGRESS NOTES
[facility] at [facility]  [address] [address]                                                                                      Phone: [phone]   Fax: [phone]    Progress Note  Patient name: Hua Simms Start of Care: 3/25/2025   Referral source: Johnny Santana MD : 1955               Medical Diagnosis: Superior glenoid labrum lesion of right shoulder, initial encounter  Pain in right shoulder Onset Date:25               Treatment Diagnosis:  M25.511  RIGHT SHOULDER PAIN                                          Prior Hospitalization: see medical history Provider#: [id number]      Comorbidities: Musculoskeletal disorders     Prior Level of Function: right TKA, thyroid disease    Reporting Period: 3/25/25-25    Visits from Start of Care: 4    Missed Visits: 0    Updated Goals/Measure of Progress: To be achieved in 4 weeks:    Short Term Goals: To be accomplished in 8 treatments:  Patient will be independent and compliant with HEP to progress toward goals and restore functional mobility.   Eval Status: issued at [facility]  Current: Patient reports good early compliance with initial HEP.      25     Pt will demonstrate right shoulder AROM flexion to 160 to aid in functional mechanics for ADLs.  Eval Status: 150  Current: right 157 degrees, improved, left 152     Long Term Goals: To be accomplished in 16 treatments:  Patient will improve QDI score by 19 points to indicate significant improvement in functional status.  Eval Status: QDI 38.6  Current: will assess during upcoming appts 25     Pt will have 5/5 right shoulder ER strength to return to goals of lifting and reaching with improved ease.  Eval Status: 4/5 with pain  Current: right shoulder 4+/5 ER with pain, flex 5-/5 with pain 25     Patient will improve pain in right shoulder to 2/10 at worst to improve daily activity tolerance and restore prior level of function.              Eval

## 2025-04-24 NOTE — PROGRESS NOTES
PHYSICAL / OCCUPATIONAL THERAPY - DAILY TREATMENT NOTE (updated )    Patient Name: Hua Simms    Date: 2025    : 1955  Insurance: Payor: MEDICARE / Plan: MEDICARE PART A AND B / Product Type: *No Product type* /      Patient  verified Yes     Visit #   Current / Total 4 16   Time   In / Out 8:49 am 9:31 am    Pain   In / Out 0 0   Subjective Functional Status/Changes: \"I went to the gym after PT the other day and then did a bunch of yardwork and it was feeling it.\" Pt notes increased pain the last few days as above. Questions whether PT will improve his shoulder. States he is following up with his physician next week.   Changes to:  Meds, Allergies, Med Hx, Sx Hx?  If yes, update Summary List no       TREATMENT AREA =  Superior glenoid labrum lesion of right shoulder, initial encounter [S43.431A]  Pain in right shoulder [M25.511]    If an interpreting service is utilized for treatment of this patient, the contents of this document represent the material reviewed with the patient via the .     OBJECTIVE    Therapeutic Procedures:  Tx Min Billable or 1:1 Min (if diff from Tx Min) Procedure, Rationale, Specifics   17  08543 Therapeutic Exercise (timed):  increase ROM, strength, coordination, balance, and proprioception to improve patient's ability to progress to PLOF and address remaining functional goals. (see flow sheet as applicable)     Details if applicable:       10  13093 Therapeutic Activity (timed):  use of dynamic activities replicating functional movements to increase ROM, strength, coordination, balance, and proprioception in order to improve patient's ability to progress to PLOF and address remaining functional goals.  (see flow sheet as applicable)     Details if applicable:     15  68626 Neuromuscular Re-Education (timed):  improve balance, coordination, kinesthetic sense, posture, core stability and proprioception to improve patient's ability to develop conscious control

## 2025-04-28 ENCOUNTER — HOSPITAL ENCOUNTER (OUTPATIENT)
Facility: HOSPITAL | Age: 70
Setting detail: RECURRING SERIES
Discharge: HOME | End: 2025-05-01
Payer: MEDICARE

## 2025-04-28 PROCEDURE — 97110 THERAPEUTIC EXERCISES: CPT

## 2025-04-28 PROCEDURE — 97530 THERAPEUTIC ACTIVITIES: CPT

## 2025-04-28 PROCEDURE — 97112 NEUROMUSCULAR REEDUCATION: CPT

## 2025-04-28 NOTE — PROGRESS NOTES
PHYSICAL / OCCUPATIONAL THERAPY - DAILY TREATMENT NOTE     Patient Name: Hua Simms    Date: 2025    : 1955  Insurance: Payor: MEDICARE / Plan: MEDICARE PART A AND B / Product Type: *No Product type* /      Patient  verified Yes     Visit #   Current / Total 5 16   Time   In / Out 8:49 am 9:35 am   Pain   In / Out 3 2   Subjective Functional Status/Changes: \"Its about the same.\" Pt notes some increased soreness from cleaning his house this weekend and doing some yardwork.   Changes to:  Allergies, Med Hx, Sx Hx?   no       TREATMENT AREA =  Superior glenoid labrum lesion of right shoulder, initial encounter [S43.431A]  Pain in right shoulder [M25.511]    If an interpreting service is utilized for treatment of this patient, the contents of this document represent the material reviewed with the patient via the .     OBJECTIVE  Therapeutic Procedures:  Tx Min Billable or 1:1 Min (if diff from Tx Min) Procedure, Rationale, Specifics   26  70538 Therapeutic Exercise (timed):  increase ROM, strength, coordination, balance, and proprioception to improve patient's ability to progress to PLOF and address remaining functional goals. (see flow sheet as applicable)    Details if applicable:       10  62811 Neuromuscular Re-Education (timed):  improve balance, coordination, kinesthetic sense, posture, core stability and proprioception to improve patient's ability to develop conscious control of individual muscles and awareness of position of extremities in order to progress to PLOF and address remaining functional goals. (see flow sheet as applicable)    Details if applicable:     10  64821 Therapeutic Activity (timed):  use of dynamic activities replicating functional movements to increase ROM, strength, coordination, balance, and proprioception in order to improve patient's ability to progress to PLOF and address remaining functional goals.  (see flow sheet as applicable)     Details if applicable:

## 2025-05-13 ENCOUNTER — TELEPHONE (OUTPATIENT)
Facility: HOSPITAL | Age: 70
End: 2025-05-13

## 2025-05-13 NOTE — TELEPHONE ENCOUNTER
Called to inquire if pt wanted to continue PT but prefers to do exercises @ home & requested to be DC.